# Patient Record
Sex: FEMALE | Race: WHITE | Employment: FULL TIME | URBAN - METROPOLITAN AREA
[De-identification: names, ages, dates, MRNs, and addresses within clinical notes are randomized per-mention and may not be internally consistent; named-entity substitution may affect disease eponyms.]

---

## 2017-03-30 ENCOUNTER — OFFICE VISIT (OUTPATIENT)
Dept: ORTHOPEDIC SURGERY | Age: 47
End: 2017-03-30

## 2017-03-30 VITALS
HEART RATE: 68 BPM | DIASTOLIC BLOOD PRESSURE: 70 MMHG | WEIGHT: 293 LBS | HEIGHT: 65 IN | BODY MASS INDEX: 48.82 KG/M2 | SYSTOLIC BLOOD PRESSURE: 116 MMHG

## 2017-03-30 DIAGNOSIS — M17.0 PRIMARY OSTEOARTHRITIS OF BOTH KNEES: Primary | ICD-10-CM

## 2017-03-30 DIAGNOSIS — M25.561 PAIN IN BOTH KNEES, UNSPECIFIED CHRONICITY: ICD-10-CM

## 2017-03-30 DIAGNOSIS — M25.562 PAIN IN BOTH KNEES, UNSPECIFIED CHRONICITY: ICD-10-CM

## 2017-03-30 DIAGNOSIS — E66.01 MORBID OBESITY, UNSPECIFIED OBESITY TYPE (HCC): ICD-10-CM

## 2017-03-30 PROCEDURE — 20610 DRAIN/INJ JOINT/BURSA W/O US: CPT | Performed by: ORTHOPAEDIC SURGERY

## 2017-03-30 PROCEDURE — 73564 X-RAY EXAM KNEE 4 OR MORE: CPT | Performed by: ORTHOPAEDIC SURGERY

## 2017-03-30 PROCEDURE — 99204 OFFICE O/P NEW MOD 45 MIN: CPT | Performed by: ORTHOPAEDIC SURGERY

## 2017-03-30 RX ORDER — FUROSEMIDE 20 MG/1
TABLET ORAL DAILY PRN
COMMUNITY
Start: 2017-03-27

## 2017-06-27 ENCOUNTER — OFFICE VISIT (OUTPATIENT)
Dept: ORTHOPEDIC SURGERY | Age: 47
End: 2017-06-27

## 2017-06-27 ENCOUNTER — TELEPHONE (OUTPATIENT)
Dept: ORTHOPEDIC SURGERY | Age: 47
End: 2017-06-27

## 2017-06-27 VITALS — BODY MASS INDEX: 48.82 KG/M2 | WEIGHT: 293 LBS | HEIGHT: 65 IN

## 2017-06-27 DIAGNOSIS — M25.561 PAIN IN BOTH KNEES, UNSPECIFIED CHRONICITY: ICD-10-CM

## 2017-06-27 DIAGNOSIS — E66.01 MORBID OBESITY, UNSPECIFIED OBESITY TYPE (HCC): ICD-10-CM

## 2017-06-27 DIAGNOSIS — M25.562 PAIN IN BOTH KNEES, UNSPECIFIED CHRONICITY: ICD-10-CM

## 2017-06-27 DIAGNOSIS — M17.0 PRIMARY OSTEOARTHRITIS OF BOTH KNEES: Primary | ICD-10-CM

## 2017-06-27 PROCEDURE — 99213 OFFICE O/P EST LOW 20 MIN: CPT | Performed by: ORTHOPAEDIC SURGERY

## 2017-06-27 PROCEDURE — 20610 DRAIN/INJ JOINT/BURSA W/O US: CPT | Performed by: ORTHOPAEDIC SURGERY

## 2017-07-18 ENCOUNTER — OFFICE VISIT (OUTPATIENT)
Dept: ORTHOPEDIC SURGERY | Age: 47
End: 2017-07-18

## 2017-07-18 VITALS — WEIGHT: 293 LBS | HEIGHT: 65 IN | BODY MASS INDEX: 48.82 KG/M2

## 2017-07-18 DIAGNOSIS — M17.0 PRIMARY OSTEOARTHRITIS OF BOTH KNEES: ICD-10-CM

## 2017-07-18 DIAGNOSIS — M25.562 PAIN IN BOTH KNEES, UNSPECIFIED CHRONICITY: Primary | ICD-10-CM

## 2017-07-18 DIAGNOSIS — M25.561 PAIN IN BOTH KNEES, UNSPECIFIED CHRONICITY: Primary | ICD-10-CM

## 2017-07-18 PROCEDURE — 20610 DRAIN/INJ JOINT/BURSA W/O US: CPT | Performed by: ORTHOPAEDIC SURGERY

## 2017-07-18 PROCEDURE — 99213 OFFICE O/P EST LOW 20 MIN: CPT | Performed by: ORTHOPAEDIC SURGERY

## 2017-07-20 ENCOUNTER — HOSPITAL ENCOUNTER (OUTPATIENT)
Dept: MAMMOGRAPHY | Age: 47
Discharge: HOME | End: 2017-07-20
Attending: NURSE PRACTITIONER | Admitting: NURSE PRACTITIONER

## 2017-07-20 DIAGNOSIS — Z12.31 ENCOUNTER FOR SCREENING MAMMOGRAM FOR BREAST CANCER: ICD-10-CM

## 2017-07-24 ENCOUNTER — TELEPHONE (OUTPATIENT)
Dept: ORTHOPEDIC SURGERY | Age: 47
End: 2017-07-24

## 2017-08-01 ENCOUNTER — OFFICE VISIT (OUTPATIENT)
Dept: ORTHOPEDIC SURGERY | Age: 47
End: 2017-08-01

## 2017-08-01 DIAGNOSIS — M79.671 RIGHT FOOT PAIN: ICD-10-CM

## 2017-08-01 DIAGNOSIS — M17.0 PRIMARY OSTEOARTHRITIS OF BOTH KNEES: Primary | ICD-10-CM

## 2017-08-01 PROCEDURE — 20610 DRAIN/INJ JOINT/BURSA W/O US: CPT | Performed by: ORTHOPAEDIC SURGERY

## 2017-08-08 ENCOUNTER — OFFICE VISIT (OUTPATIENT)
Dept: ORTHOPEDIC SURGERY | Age: 47
End: 2017-08-08

## 2017-08-08 VITALS
WEIGHT: 293 LBS | SYSTOLIC BLOOD PRESSURE: 114 MMHG | DIASTOLIC BLOOD PRESSURE: 69 MMHG | BODY MASS INDEX: 48.82 KG/M2 | HEART RATE: 70 BPM | HEIGHT: 65 IN

## 2017-08-08 DIAGNOSIS — M25.572 ACUTE LEFT ANKLE PAIN: Primary | ICD-10-CM

## 2017-08-08 DIAGNOSIS — M65.879 OTHER SYNOVITIS AND TENOSYNOVITIS, UNSPECIFIED ANKLE AND FOOT: ICD-10-CM

## 2017-08-08 DIAGNOSIS — M79.671 FOOT PAIN, RIGHT: ICD-10-CM

## 2017-08-08 DIAGNOSIS — M77.8 CAPSULITIS OF FOOT, RIGHT: ICD-10-CM

## 2017-08-08 DIAGNOSIS — M17.0 PRIMARY OSTEOARTHRITIS OF BOTH KNEES: Primary | ICD-10-CM

## 2017-08-08 DIAGNOSIS — M25.571 ACUTE RIGHT ANKLE PAIN: ICD-10-CM

## 2017-08-08 DIAGNOSIS — M79.672 FOOT PAIN, LEFT: ICD-10-CM

## 2017-08-08 PROCEDURE — 20610 DRAIN/INJ JOINT/BURSA W/O US: CPT | Performed by: ORTHOPAEDIC SURGERY

## 2017-08-08 PROCEDURE — 73630 X-RAY EXAM OF FOOT: CPT | Performed by: PODIATRIST

## 2017-08-08 PROCEDURE — 73600 X-RAY EXAM OF ANKLE: CPT | Performed by: PODIATRIST

## 2017-08-08 PROCEDURE — 99214 OFFICE O/P EST MOD 30 MIN: CPT | Performed by: PODIATRIST

## 2017-08-08 PROCEDURE — L3260 AMBULATORY SURGICAL BOOT EAC: HCPCS | Performed by: PODIATRIST

## 2017-08-08 RX ORDER — POTASSIUM CHLORIDE 750 MG/1
TABLET, FILM COATED, EXTENDED RELEASE ORAL
Refills: 3 | COMMUNITY
Start: 2017-07-18

## 2017-08-12 ENCOUNTER — HOSPITAL ENCOUNTER (OUTPATIENT)
Dept: ULTRASOUND IMAGING | Age: 47
Discharge: HOME | End: 2017-08-12
Attending: NURSE PRACTITIONER | Admitting: NURSE PRACTITIONER

## 2017-08-12 DIAGNOSIS — R10.11 ABDOMINAL PAIN, RIGHT UPPER QUADRANT: ICD-10-CM

## 2017-08-12 DIAGNOSIS — M25.561 PAIN IN RIGHT KNEE: ICD-10-CM

## 2017-09-20 ENCOUNTER — OFFICE VISIT (OUTPATIENT)
Dept: ORTHOPEDIC SURGERY | Age: 47
End: 2017-09-20

## 2017-09-20 VITALS
DIASTOLIC BLOOD PRESSURE: 76 MMHG | BODY MASS INDEX: 48.82 KG/M2 | WEIGHT: 293 LBS | HEIGHT: 65 IN | HEART RATE: 74 BPM | SYSTOLIC BLOOD PRESSURE: 125 MMHG

## 2017-09-20 DIAGNOSIS — M77.8 CAPSULITIS OF FOOT, RIGHT: Primary | ICD-10-CM

## 2017-09-20 PROCEDURE — 20600 DRAIN/INJ JOINT/BURSA W/O US: CPT | Performed by: PODIATRIST

## 2017-09-20 PROCEDURE — 99213 OFFICE O/P EST LOW 20 MIN: CPT | Performed by: PODIATRIST

## 2017-09-20 RX ORDER — CYCLOBENZAPRINE HCL 5 MG
TABLET ORAL
Refills: 0 | COMMUNITY
Start: 2017-08-11 | End: 2020-02-29 | Stop reason: ALTCHOICE

## 2017-12-28 ENCOUNTER — OFFICE VISIT (OUTPATIENT)
Dept: ORTHOPEDIC SURGERY | Age: 47
End: 2017-12-28

## 2017-12-28 VITALS — BODY MASS INDEX: 48.82 KG/M2 | HEIGHT: 65 IN | WEIGHT: 293 LBS

## 2017-12-28 DIAGNOSIS — M17.0 PRIMARY OSTEOARTHRITIS OF BOTH KNEES: Primary | ICD-10-CM

## 2017-12-28 DIAGNOSIS — M77.8 CAPSULITIS OF RIGHT FOOT: ICD-10-CM

## 2017-12-28 PROCEDURE — 20610 DRAIN/INJ JOINT/BURSA W/O US: CPT | Performed by: ORTHOPAEDIC SURGERY

## 2017-12-28 PROCEDURE — 99213 OFFICE O/P EST LOW 20 MIN: CPT | Performed by: ORTHOPAEDIC SURGERY

## 2017-12-28 PROCEDURE — 20600 DRAIN/INJ JOINT/BURSA W/O US: CPT | Performed by: ORTHOPAEDIC SURGERY

## 2017-12-28 NOTE — PROGRESS NOTES
KNEE VISIT      HISTORY OF PRESENT ILLNESS    De Schulz is a 49 y.o. female who presents for evaluation of her left knee with a right knee and right foot. She's had recurrence of pain in her left knee and realizes that she is at high risk for doing anything such as knee replacement surgery because of her age and BMI. At this time she just would like relief. She also obtain an injection into her right foot in the past which help substantially. ROS    Constitutional:  Denies fever or chills   Eyes:  Denies change in visual acuity   HENT:  Denies nasal congestion or sore throat   Respiratory:  Denies cough or shortness of breath   Cardiovascular:  Denies chest pain or edema   GI:  Denies abdominal pain, nausea, vomiting, bloody stools or diarrhea   :  Denies dysuria   Musculoskeletal:  Denies back pain or joint pain   Integument:  Denies rash   Neurologic:  Denies headache, focal weakness or sensory changes   Endocrine:  Denies polyuria or polydipsia   Lymphatic:  Denies swollen glands   Psychiatric:  Denies depression or anxiety   All other ROS negative except for above. Past Surgical history    Past Surgical History:   Procedure Laterality Date    APPENDECTOMY      BREAST SURGERY      bilateral lumpectomy    CARPAL TUNNEL RELEASE      bilateral    CHOLECYSTECTOMY      HYSTERECTOMY      KNEE SURGERY         PAST MEDICAL    Past Medical History:   Diagnosis Date    Other synovitis and tenosynovitis, unspecified ankle and foot 8/8/2017    Reflux     Sleep apnea        Allergies    Allergies   Allergen Reactions    No Known Allergies        Meds    Current Outpatient Prescriptions   Medication Sig Dispense Refill    cyclobenzaprine (FLEXERIL) 5 MG tablet TAKE 1 TABLET BY MOUTH 3 (THREE) TIMES DAILY AS NEEDED.  0    KLOR-CON 10 10 MEQ extended release tablet TAKE 1 TABLET BY MOUTH 2 (TWO) TIMES DAILY.   3    diclofenac 2 % SOLN Place 40 mg onto the skin 2 times daily 1 Bottle 3    furosemide (LASIX) 20 MG tablet        No current facility-administered medications for this visit. Social    Social History     Social History    Marital status:      Spouse name: N/A    Number of children: N/A    Years of education: N/A     Occupational History    Not on file. Social History Main Topics    Smoking status: Never Smoker    Smokeless tobacco: Never Used    Alcohol use No    Drug use: No    Sexual activity: Yes     Partners: Male     Other Topics Concern    Not on file     Social History Narrative    No narrative on file       Family HISTORY    History reviewed. No pertinent family history. PHYSICAL EXAM    Vital Signs:  Ht 5' 4.96\" (1.65 m)   Wt (!) 300 lb 0.7 oz (136.1 kg)   BMI 49.99 kg/m²   General Appearance:  Obese body habitus with a BMI of 50. Alert and oriented to person, place, and time. Affect:  Normal.   Gait:  Normal. Good balance and coordination. Skin:  Intact. Sensation:  Intact. Strength:  Intact. Reflexes:  Intact. Pulses:  Intact. Knee Exam:    Effusion:  Negative    Range of Motion Right Left   Extension 0 0   Flexion 110 110     Provocative Test Right Left    Positive Negative Positive Negative   Anterior drawer [] [x] [] [x]   Lachman [] [x] [] [x]   Posterior drawer [] [x] [] [x]   Varus testing [] [x] [] [x]   Valgus testing [] [x] [x] []   Joint line tenderness [] [x] [x] []     Additional Exam Comments:  Her neurocirculatory lymphatic exam is otherwise normal symmetric to both lower extremities, although she does have some metatarsalgia and between the 2nd 3rd metatarsal heads. The anterior surface. She had an injection in the past which seemed to help. IMAGING STUDIES    X-rays were not done today    IMPRESSION    Left knee, motion patella/osteoarthritis  Right foot 3rd metatarsophalangeal joint capsulitis    PLAN      1.   Conservative care options including physical therapy, NSAIDs, bracing, and activity modification were discussed. 2.  The indications for therapeutic injections were discussed. 3.  The indications for additional imaging studies were discussed. 4.  After considering the various options discussed, the patient elected to pursue a course that includes injections into left knee and right foot    Recommendation is for a cortisone injection into the right knee. After informed consent was received from the patient, the right knee was injected with 1 mL(40mg)Depo-Medrol and 4 mL of 0.25% Marcaine  in the syringe from an anterolateral joint line approach, using a 25-gauge needle, under sterile Betadine prep, using ethyl chloride as a topical refrigerant, for a diagnosis of osteoarthritis. The patient appeared to tolerate it well. The patient should return here periodically as needed. An injection was performed to the bottom of her right foot into the metatarsal phalangeal joint around the 3rd ray. It was done under sterile Betadine prep, using ethyl chloride as topical refrigerant. The solution was 1 mL of bupivacaine 0.25%, 1 mL of betamethasone, and 1 mL of 1% Xylocaine.

## 2017-12-28 NOTE — PROGRESS NOTES
Site: LEFT KNEE        BUPIVACAINE 0.25%  NDC# 4747-2836-46  LOT NUMBER: [id number]        DEPO 40mg    NDC#  6042-8012-50  LOT NUMBER: [id number]    Site: RIGHT FOOT    Betamethazone  Lot number: [id number]  NDC#  7114-0723-63(ZGZN)    Lidocaine  LidocaineNDC# 5380-7755-52  Lot number: [id number]    Bupivacaine  NDC# 5403-3077-51  Lot number: [id number]

## 2018-01-23 ENCOUNTER — OFFICE VISIT (OUTPATIENT)
Dept: ORTHOPEDIC SURGERY | Age: 48
End: 2018-01-23

## 2018-01-23 VITALS — HEIGHT: 64 IN | BODY MASS INDEX: 50.02 KG/M2 | WEIGHT: 293 LBS

## 2018-01-23 DIAGNOSIS — M17.0 PRIMARY OSTEOARTHRITIS OF BOTH KNEES: Primary | ICD-10-CM

## 2018-01-23 PROCEDURE — G8427 DOCREV CUR MEDS BY ELIG CLIN: HCPCS | Performed by: ORTHOPAEDIC SURGERY

## 2018-01-23 PROCEDURE — G8417 CALC BMI ABV UP PARAM F/U: HCPCS | Performed by: ORTHOPAEDIC SURGERY

## 2018-01-23 PROCEDURE — 1036F TOBACCO NON-USER: CPT | Performed by: ORTHOPAEDIC SURGERY

## 2018-01-23 PROCEDURE — 99213 OFFICE O/P EST LOW 20 MIN: CPT | Performed by: ORTHOPAEDIC SURGERY

## 2018-01-23 PROCEDURE — G8484 FLU IMMUNIZE NO ADMIN: HCPCS | Performed by: ORTHOPAEDIC SURGERY

## 2018-01-23 NOTE — PROGRESS NOTES
KNEE VISIT      HISTORY OF PRESENT ILLNESS    Kate Marc is a 49 y.o. female who presents for evaluation of her left knee. The operative    ROS    Constitutional:  Denies fever or chills   Eyes:  Denies change in visual acuity   HENT:  Denies nasal congestion or sore throat   Respiratory:  Denies cough or shortness of breath   Cardiovascular:  Denies chest pain or edema   GI:  Denies abdominal pain, nausea, vomiting, bloody stools or diarrhea   :  Denies dysuria   Musculoskeletal:  Denies back pain or joint pain   Integument:  Denies rash   Neurologic:  Denies headache, focal weakness or sensory changes   Endocrine:  Denies polyuria or polydipsia   Lymphatic:  Denies swollen glands   Psychiatric:  Denies depression or anxiety   All other ROS negative except for above. Past Surgical history    Past Surgical History:   Procedure Laterality Date    APPENDECTOMY      BREAST SURGERY      bilateral lumpectomy    CARPAL TUNNEL RELEASE      bilateral    CHOLECYSTECTOMY      HYSTERECTOMY      KNEE SURGERY         PAST MEDICAL    Past Medical History:   Diagnosis Date    Other synovitis and tenosynovitis, unspecified ankle and foot 8/8/2017    Reflux     Sleep apnea        Allergies    Allergies   Allergen Reactions    No Known Allergies        Meds    Current Outpatient Prescriptions   Medication Sig Dispense Refill    cyclobenzaprine (FLEXERIL) 5 MG tablet TAKE 1 TABLET BY MOUTH 3 (THREE) TIMES DAILY AS NEEDED.  0    KLOR-CON 10 10 MEQ extended release tablet TAKE 1 TABLET BY MOUTH 2 (TWO) TIMES DAILY. 3    diclofenac 2 % SOLN Place 40 mg onto the skin 2 times daily 1 Bottle 3    furosemide (LASIX) 20 MG tablet        No current facility-administered medications for this visit. Social    Social History     Social History    Marital status:      Spouse name: N/A    Number of children: N/A    Years of education: N/A     Occupational History    Not on file.      Social History Main

## 2018-01-24 ENCOUNTER — TELEPHONE (OUTPATIENT)
Dept: ORTHOPEDIC SURGERY | Age: 48
End: 2018-01-24

## 2018-01-24 DIAGNOSIS — M17.12 LOCALIZED OSTEOARTHRITIS OF LEFT KNEE: ICD-10-CM

## 2018-01-24 DIAGNOSIS — M23.42 LOOSE BODY OF LEFT KNEE: ICD-10-CM

## 2018-01-26 ENCOUNTER — HOSPITAL ENCOUNTER (OUTPATIENT)
Dept: SURGERY | Age: 48
Discharge: HOME | End: 2018-01-26
Attending: ORTHOPAEDIC SURGERY | Admitting: ORTHOPAEDIC SURGERY

## 2018-01-26 VITALS
RESPIRATION RATE: 14 BRPM | DIASTOLIC BLOOD PRESSURE: 67 MMHG | WEIGHT: 293 LBS | HEART RATE: 71 BPM | OXYGEN SATURATION: 94 % | SYSTOLIC BLOOD PRESSURE: 101 MMHG | BODY MASS INDEX: 50.02 KG/M2 | TEMPERATURE: 97 F | HEIGHT: 64 IN

## 2018-01-26 DIAGNOSIS — M17.12 PRIMARY OSTEOARTHRITIS OF LEFT KNEE: Primary | ICD-10-CM

## 2018-01-26 LAB
ANION GAP SERPL CALCULATED.3IONS-SCNC: 11 MMOL/L (ref 3–16)
BUN BLDV-MCNC: 13 MG/DL (ref 7–20)
CALCIUM SERPL-MCNC: 8.6 MG/DL (ref 8.3–10.6)
CHLORIDE BLD-SCNC: 103 MMOL/L (ref 99–110)
CO2: 26 MMOL/L (ref 21–32)
CREAT SERPL-MCNC: <0.5 MG/DL (ref 0.6–1.1)
GFR AFRICAN AMERICAN: >60
GFR NON-AFRICAN AMERICAN: >60
GLUCOSE BLD-MCNC: 85 MG/DL (ref 70–99)
POTASSIUM REFLEX MAGNESIUM: 4 MMOL/L (ref 3.5–5.1)
PREGNANCY, URINE: NEGATIVE
SODIUM BLD-SCNC: 140 MMOL/L (ref 136–145)

## 2018-01-26 RX ORDER — SODIUM CHLORIDE 0.9 % (FLUSH) 0.9 %
10 SYRINGE (ML) INJECTION EVERY 12 HOURS SCHEDULED
Status: DISCONTINUED | OUTPATIENT
Start: 2018-01-26 | End: 2018-01-27 | Stop reason: HOSPADM

## 2018-01-26 RX ORDER — LABETALOL HYDROCHLORIDE 5 MG/ML
5 INJECTION, SOLUTION INTRAVENOUS EVERY 10 MIN PRN
Status: DISCONTINUED | OUTPATIENT
Start: 2018-01-26 | End: 2018-01-27 | Stop reason: HOSPADM

## 2018-01-26 RX ORDER — LIDOCAINE HYDROCHLORIDE 10 MG/ML
1 INJECTION, SOLUTION EPIDURAL; INFILTRATION; INTRACAUDAL; PERINEURAL
Status: COMPLETED | OUTPATIENT
Start: 2018-01-26 | End: 2018-01-26

## 2018-01-26 RX ORDER — MEPERIDINE HYDROCHLORIDE 25 MG/ML
12.5 INJECTION INTRAMUSCULAR; INTRAVENOUS; SUBCUTANEOUS EVERY 5 MIN PRN
Status: DISCONTINUED | OUTPATIENT
Start: 2018-01-26 | End: 2018-01-27 | Stop reason: HOSPADM

## 2018-01-26 RX ORDER — ALUMINA, MAGNESIA, AND SIMETHICONE 2400; 2400; 240 MG/30ML; MG/30ML; MG/30ML
SUSPENSION ORAL
Status: COMPLETED
Start: 2018-01-26 | End: 2018-01-26

## 2018-01-26 RX ORDER — HYDROCODONE BITARTRATE AND ACETAMINOPHEN 7.5; 325 MG/1; MG/1
1 TABLET ORAL EVERY 6 HOURS PRN
Qty: 28 TABLET | Refills: 0 | Status: SHIPPED | OUTPATIENT
Start: 2018-01-26 | End: 2018-02-02

## 2018-01-26 RX ORDER — OXYCODONE HYDROCHLORIDE AND ACETAMINOPHEN 5; 325 MG/1; MG/1
1 TABLET ORAL PRN
Status: ACTIVE | OUTPATIENT
Start: 2018-01-26 | End: 2018-01-26

## 2018-01-26 RX ORDER — MORPHINE SULFATE 2 MG/ML
2 INJECTION, SOLUTION INTRAMUSCULAR; INTRAVENOUS EVERY 5 MIN PRN
Status: DISCONTINUED | OUTPATIENT
Start: 2018-01-26 | End: 2018-01-27 | Stop reason: HOSPADM

## 2018-01-26 RX ORDER — HYDRALAZINE HYDROCHLORIDE 20 MG/ML
5 INJECTION INTRAMUSCULAR; INTRAVENOUS EVERY 10 MIN PRN
Status: DISCONTINUED | OUTPATIENT
Start: 2018-01-26 | End: 2018-01-27 | Stop reason: HOSPADM

## 2018-01-26 RX ORDER — DIPHENHYDRAMINE HYDROCHLORIDE 50 MG/ML
12.5 INJECTION INTRAMUSCULAR; INTRAVENOUS
Status: ACTIVE | OUTPATIENT
Start: 2018-01-26 | End: 2018-01-26

## 2018-01-26 RX ORDER — SODIUM CHLORIDE 0.9 % (FLUSH) 0.9 %
10 SYRINGE (ML) INJECTION PRN
Status: DISCONTINUED | OUTPATIENT
Start: 2018-01-26 | End: 2018-01-27 | Stop reason: HOSPADM

## 2018-01-26 RX ORDER — MORPHINE SULFATE 2 MG/ML
1 INJECTION, SOLUTION INTRAMUSCULAR; INTRAVENOUS EVERY 5 MIN PRN
Status: DISCONTINUED | OUTPATIENT
Start: 2018-01-26 | End: 2018-01-27 | Stop reason: HOSPADM

## 2018-01-26 RX ORDER — ONDANSETRON 2 MG/ML
4 INJECTION INTRAMUSCULAR; INTRAVENOUS
Status: COMPLETED | OUTPATIENT
Start: 2018-01-26 | End: 2018-01-26

## 2018-01-26 RX ORDER — ALUMINA, MAGNESIA, AND SIMETHICONE 2400; 2400; 240 MG/30ML; MG/30ML; MG/30ML
30 SUSPENSION ORAL EVERY 6 HOURS PRN
Status: DISCONTINUED | OUTPATIENT
Start: 2018-01-26 | End: 2018-01-27 | Stop reason: HOSPADM

## 2018-01-26 RX ORDER — TRANEXAMIC ACID 100 MG/ML
2000 INJECTION, SOLUTION INTRAVENOUS
Status: ACTIVE | OUTPATIENT
Start: 2018-01-26 | End: 2018-01-26

## 2018-01-26 RX ORDER — SODIUM CHLORIDE, SODIUM LACTATE, POTASSIUM CHLORIDE, CALCIUM CHLORIDE 600; 310; 30; 20 MG/100ML; MG/100ML; MG/100ML; MG/100ML
INJECTION, SOLUTION INTRAVENOUS CONTINUOUS
Status: DISCONTINUED | OUTPATIENT
Start: 2018-01-26 | End: 2018-01-27 | Stop reason: HOSPADM

## 2018-01-26 RX ORDER — PROMETHAZINE HYDROCHLORIDE 25 MG/ML
6.25 INJECTION, SOLUTION INTRAMUSCULAR; INTRAVENOUS
Status: ACTIVE | OUTPATIENT
Start: 2018-01-26 | End: 2018-01-26

## 2018-01-26 RX ORDER — OXYCODONE HYDROCHLORIDE AND ACETAMINOPHEN 5; 325 MG/1; MG/1
2 TABLET ORAL PRN
Status: ACTIVE | OUTPATIENT
Start: 2018-01-26 | End: 2018-01-26

## 2018-01-26 RX ADMIN — SODIUM CHLORIDE, SODIUM LACTATE, POTASSIUM CHLORIDE, CALCIUM CHLORIDE: 600; 310; 30; 20 INJECTION, SOLUTION INTRAVENOUS at 08:09

## 2018-01-26 RX ADMIN — Medication 0.5 MG: at 11:05

## 2018-01-26 RX ADMIN — Medication 0.5 MG: at 10:19

## 2018-01-26 RX ADMIN — SODIUM CHLORIDE, SODIUM LACTATE, POTASSIUM CHLORIDE, CALCIUM CHLORIDE: 600; 310; 30; 20 INJECTION, SOLUTION INTRAVENOUS at 11:05

## 2018-01-26 RX ADMIN — ALUMINA, MAGNESIA, AND SIMETHICONE 30 ML: 2400; 2400; 240 SUSPENSION ORAL at 10:34

## 2018-01-26 RX ADMIN — ONDANSETRON 4 MG: 2 INJECTION INTRAMUSCULAR; INTRAVENOUS at 13:00

## 2018-01-26 RX ADMIN — LIDOCAINE HYDROCHLORIDE 1 ML: 10 INJECTION, SOLUTION EPIDURAL; INFILTRATION; INTRACAUDAL; PERINEURAL at 08:09

## 2018-01-26 RX ADMIN — Medication 0.5 MG: at 10:11

## 2018-01-26 RX ADMIN — Medication 0.5 MG: at 11:13

## 2018-01-26 RX ADMIN — Medication 0.5 MG: at 10:34

## 2018-01-26 RX ADMIN — MEPERIDINE HYDROCHLORIDE 12.5 MG: 25 INJECTION INTRAMUSCULAR; INTRAVENOUS; SUBCUTANEOUS at 10:26

## 2018-01-26 RX ADMIN — Medication 0.5 MG: at 10:39

## 2018-01-26 ASSESSMENT — PAIN SCALES - GENERAL
PAINLEVEL_OUTOF10: 8
PAINLEVEL_OUTOF10: 7
PAINLEVEL_OUTOF10: 8

## 2018-01-26 ASSESSMENT — PAIN - FUNCTIONAL ASSESSMENT: PAIN_FUNCTIONAL_ASSESSMENT: 0-10

## 2018-01-26 NOTE — PROGRESS NOTES
D/c instructions given to pt and family. Verbalized understanding. Wheeled pt out to personal transportation in stable condition.

## 2018-01-26 NOTE — ANESTHESIA PRE-OP
Department of Anesthesiology  Preprocedure Note       Name:  Kristen Stephen   Age:  49 y.o.  :  1970                                          MRN:  [id number]         Date:  2018      Surgeon:    Procedure:    Medications prior to admission:   Prior to Admission medications    Medication Sig Start Date End Date Taking? Authorizing Provider   HYDROcodone-acetaminophen (NORCO) 5-325 MG per tablet Take 1 tablet by mouth every 6 hours as needed for Pain. Yes Historical Provider, MD   cyclobenzaprine (FLEXERIL) 5 MG tablet TAKE 1 TABLET BY MOUTH 3 (THREE) TIMES DAILY AS NEEDED. 17   Historical Provider, MD GALDAMEZOR-CON 10 10 MEQ extended release tablet TAKE 1 TABLET BY MOUTH 2 (TWO) TIMES DAILY., ON DAYS TAKING LASIX 17   Historical Provider, MD   furosemide (LASIX) 20 MG tablet daily as needed  3/27/17   Historical Provider, MD       Current medications:    Current Outpatient Prescriptions   Medication Sig Dispense Refill    HYDROcodone-acetaminophen (NORCO) 5-325 MG per tablet Take 1 tablet by mouth every 6 hours as needed for Pain.       cyclobenzaprine (FLEXERIL) 5 MG tablet TAKE 1 TABLET BY MOUTH 3 (THREE) TIMES DAILY AS NEEDED.  0    KLOR-CON 10 10 MEQ extended release tablet TAKE 1 TABLET BY MOUTH 2 (TWO) TIMES DAILY., ON DAYS TAKING LASIX  3    furosemide (LASIX) 20 MG tablet daily as needed        Current Facility-Administered Medications   Medication Dose Route Frequency Provider Last Rate Last Dose    lactated ringers infusion   Intravenous Continuous Omar Alvarez  mL/hr at 18 0809      sodium chloride flush 0.9 % injection 10 mL  10 mL Intravenous 2 times per day Omar Alvarez MD        sodium chloride flush 0.9 % injection 10 mL  10 mL Intravenous PRN Omar Alvarez MD        ceFAZolin (ANCEF) 3 g in dextrose 5 % 100 mL IVPB  3 g Intravenous On Call to C/ Brandon Bravo MD        tranexamic acid (CYKLOKAPRON) injection 2,000 mg  2,000 mg

## 2018-01-26 NOTE — PROGRESS NOTES
Pt arrived to PACU c/o pain in LLE. Medication given, see MAR. Knee pain let up but c/o sharp pain in chest.  Pt states it is a familiar pain but more intense, states the pain is similar to GI pain. MD notified, Mylanta ordered and administered. Pt reports a very slight decrease in pain.

## 2018-01-26 NOTE — H&P
H&P Update    Patient's History and Physical  was reviewed. Patient examined. There has been no change.     Hollis Fix 3

## 2018-01-29 PROBLEM — M23.42 LOOSE BODY OF LEFT KNEE: Status: ACTIVE | Noted: 2018-01-29

## 2018-01-30 ENCOUNTER — OFFICE VISIT (OUTPATIENT)
Dept: ORTHOPEDIC SURGERY | Age: 48
End: 2018-01-30

## 2018-01-30 VITALS — BODY MASS INDEX: 50.02 KG/M2 | WEIGHT: 293 LBS | HEIGHT: 64 IN

## 2018-01-30 DIAGNOSIS — M17.0 PRIMARY OSTEOARTHRITIS OF BOTH KNEES: Primary | ICD-10-CM

## 2018-01-30 DIAGNOSIS — M25.561 CHRONIC PAIN OF BOTH KNEES: ICD-10-CM

## 2018-01-30 DIAGNOSIS — G89.29 CHRONIC PAIN OF BOTH KNEES: ICD-10-CM

## 2018-01-30 DIAGNOSIS — M25.562 CHRONIC PAIN OF BOTH KNEES: ICD-10-CM

## 2018-01-30 PROCEDURE — 20610 DRAIN/INJ JOINT/BURSA W/O US: CPT | Performed by: ORTHOPAEDIC SURGERY

## 2018-01-30 PROCEDURE — 99024 POSTOP FOLLOW-UP VISIT: CPT | Performed by: ORTHOPAEDIC SURGERY

## 2018-01-30 NOTE — PATIENT INSTRUCTIONS
Patient Education        Knee: Exercises  Your Care Instructions  Here are some examples of exercises for your knee. Start each exercise slowly. Ease off the exercise if you start to have pain. Your doctor or physical therapist will tell you when you can start these exercises and which ones will work best for you. How to do the exercises  Quad sets    1. Sit with your leg straight and supported on the floor or a firm bed. (If you feel discomfort in the front or back of your knee, place a small towel roll under your knee.)  2. Tighten the muscles on top of your thigh by pressing the back of your knee flat down to the floor. (If you feel discomfort under your kneecap, place a small towel roll under your knee.)  3. Hold for about 6 seconds, then rest for up to 10 seconds. 4. Do 8 to 12 repetitions several times a day. Straight-leg raises to the front    1. Lie on your back with your good knee bent so that your foot rests flat on the floor. Your injured leg should be straight. Make sure that your low back has a normal curve. You should be able to slip your flat hand in between the floor and the small of your back, with your palm touching the floor and your back touching the back of your hand. 2. Tighten the thigh muscles in the injured leg by pressing the back of your knee flat down to the floor. Hold your knee straight. 3. Keeping the thigh muscles tight, lift your injured leg up so that your heel is about 12 inches off the floor. Hold for about 6 seconds and then lower slowly. 4. Do 8 to 12 repetitions, 3 times a day. Straight-leg raises to the outside    1. Lie on your side, with your injured leg on top. 2. Tighten the front thigh muscles of your injured leg to keep your knee straight. 3. Keep your hip and your leg straight in line with the rest of your body, and keep your knee pointing forward. Do not drop your hip back.   4. Lift your injured leg straight up toward the ceiling, about 12 inches off the floor. Hold for about 6 seconds, then slowly lower your leg. 5. Do 8 to 12 repetitions. Straight-leg raises to the back    1. Lie on your stomach, and lift your leg straight up behind you (toward the ceiling). 2. Lift your toes about 6 inches off the floor, hold for about 6 seconds, then lower slowly. 3. Do 8 to 12 repetitions. Straight-leg raises to the inside    1. Lie on the side of your body with the injured leg. 2. You can either prop your other (good) leg up on a chair, or you can bend your good knee and put that foot in front of your injured knee. Do not drop your hip back. 3. Tighten the muscles on the front of your thigh to straighten your injured knee. 4. Keep your kneecap pointing forward, and lift your whole leg up toward the ceiling about 6 inches. Hold for about 6 seconds, then lower slowly. 5. Do 8 to 12 repetitions. Heel dig bridging    1. Lie on your back with both knees bent and your ankles bent so that only your heels are digging into the floor. Your knees should be bent about 90 degrees. 2. Then push your heels into the floor, squeeze your buttocks, and lift your hips off the floor until your shoulders, hips, and knees are all in a straight line. 3. Hold for about 6 seconds as you continue to breathe normally, and then slowly lower your hips back down to the floor and rest for up to 10 seconds. 4. Do 8 to 12 repetitions. Hamstring curls    1. Lie on your stomach with your knees straight. If your kneecap is uncomfortable, roll up a washcloth and put it under your leg just above your kneecap. 2. Lift the foot of your injured leg by bending the knee so that you bring the foot up toward your buttock. If this motion hurts, try it without bending your knee quite as far. This may help you avoid any painful motion. 3. Slowly lower your leg back to the floor. 4. Do 8 to 12 repetitions.   5. With permission from your doctor or physical therapist, you may also want to add a cuff weight to your ankle (not more than 5 pounds). With weight, you do not have to lift your leg more than 12 inches to get a hamstring workout. Shallow standing knee bends    Do this exercise only if you have very little pain; if you have no clicking, locking, or giving way if you have an injured knee; and if it does not hurt while you are doing 8 to 12 repetitions. 1. Stand with your hands lightly resting on a counter or chair in front of you. Put your feet shoulder-width apart. 2. Slowly bend your knees so that you squat down like you are going to sit in a chair. Make sure your knees do not go in front of your toes. 3. Lower yourself about 6 inches. Your heels should remain on the floor at all times. 4. Rise slowly to a standing position. Heel raises    1. Stand with your feet a few inches apart, with your hands lightly resting on a counter or chair in front of you. 2. Slowly raise your heels off the floor while keeping your knees straight. 3. Hold for about 6 seconds, then slowly lower your heels to the floor. 4. Do 8 to 12 repetitions several times during the day. Follow-up care is a key part of your treatment and safety. Be sure to make and go to all appointments, and call your doctor if you are having problems. It's also a good idea to know your test results and keep a list of the medicines you take. Where can you learn more? Go to https://[email or web address]. org and sign in to your [employer] account. Enter Y166 in the [facility] box to learn more about \"Knee: Exercises. \"     If you do not have an account, please click on the \"Sign Up Now\" link. Current as of: March 21, 2017  Content Version: 11.5  © 3343-2082 Healthwise, Incorporated. Care instructions adapted under license by [facility] ([facility]).  If you have questions about a medical condition or this instruction, always ask your healthcare professional. [place] [address] any warranty or liability for your use of this information.

## 2018-02-26 ENCOUNTER — OFFICE VISIT (OUTPATIENT)
Dept: ORTHOPEDIC SURGERY | Age: 48
End: 2018-02-26

## 2018-02-26 VITALS — BODY MASS INDEX: 50.02 KG/M2 | WEIGHT: 293 LBS | HEIGHT: 64 IN

## 2018-02-26 DIAGNOSIS — M17.0 PRIMARY OSTEOARTHRITIS OF BOTH KNEES: Primary | ICD-10-CM

## 2018-02-26 DIAGNOSIS — M17.12 LOCALIZED OSTEOARTHRITIS OF LEFT KNEE: ICD-10-CM

## 2018-02-26 PROCEDURE — 99024 POSTOP FOLLOW-UP VISIT: CPT | Performed by: ORTHOPAEDIC SURGERY

## 2018-03-20 ENCOUNTER — TELEPHONE (OUTPATIENT)
Dept: ORTHOPEDIC SURGERY | Age: 48
End: 2018-03-20

## 2018-03-20 ENCOUNTER — OFFICE VISIT (OUTPATIENT)
Dept: ORTHOPEDIC SURGERY | Age: 48
End: 2018-03-20

## 2018-03-20 VITALS
HEART RATE: 84 BPM | BODY MASS INDEX: 50.02 KG/M2 | SYSTOLIC BLOOD PRESSURE: 119 MMHG | WEIGHT: 293 LBS | DIASTOLIC BLOOD PRESSURE: 88 MMHG | HEIGHT: 64 IN

## 2018-03-20 DIAGNOSIS — M77.8 CAPSULITIS OF RIGHT FOOT: Primary | ICD-10-CM

## 2018-03-20 PROCEDURE — 99024 POSTOP FOLLOW-UP VISIT: CPT | Performed by: PODIATRIST

## 2018-03-20 PROCEDURE — L4361 PNEUMA/VAC WALK BOOT PRE OTS: HCPCS | Performed by: PODIATRIST

## 2018-03-20 RX ORDER — IBUPROFEN 800 MG/1
800 TABLET ORAL
COMMUNITY
End: 2018-11-28 | Stop reason: CLARIF

## 2018-04-03 ENCOUNTER — OFFICE VISIT (OUTPATIENT)
Dept: ORTHOPEDIC SURGERY | Age: 48
End: 2018-04-03

## 2018-04-03 VITALS
DIASTOLIC BLOOD PRESSURE: 76 MMHG | BODY MASS INDEX: 50.02 KG/M2 | HEIGHT: 64 IN | WEIGHT: 293 LBS | SYSTOLIC BLOOD PRESSURE: 115 MMHG | HEART RATE: 87 BPM

## 2018-04-03 DIAGNOSIS — M77.8 CAPSULITIS OF RIGHT FOOT: Primary | ICD-10-CM

## 2018-04-03 PROCEDURE — G8417 CALC BMI ABV UP PARAM F/U: HCPCS | Performed by: PODIATRIST

## 2018-04-03 PROCEDURE — 99213 OFFICE O/P EST LOW 20 MIN: CPT | Performed by: PODIATRIST

## 2018-04-03 PROCEDURE — 1036F TOBACCO NON-USER: CPT | Performed by: PODIATRIST

## 2018-04-03 PROCEDURE — G8427 DOCREV CUR MEDS BY ELIG CLIN: HCPCS | Performed by: PODIATRIST

## 2018-04-03 RX ORDER — PREDNISONE 10 MG/1
TABLET ORAL
Qty: 26 TABLET | Refills: 0 | Status: SHIPPED | OUTPATIENT
Start: 2018-04-03 | End: 2018-11-28 | Stop reason: CLARIF

## 2018-04-13 ENCOUNTER — TELEPHONE (OUTPATIENT)
Dept: ORTHOPEDIC SURGERY | Age: 48
End: 2018-04-13

## 2018-06-13 ENCOUNTER — TELEPHONE (OUTPATIENT)
Dept: ORTHOPEDIC SURGERY | Age: 48
End: 2018-06-13

## 2018-08-10 ENCOUNTER — OFFICE VISIT (OUTPATIENT)
Dept: ORTHOPEDIC SURGERY | Age: 48
End: 2018-08-10

## 2018-08-10 VITALS — BODY MASS INDEX: 48.82 KG/M2 | WEIGHT: 293 LBS | HEIGHT: 65 IN

## 2018-08-10 DIAGNOSIS — M77.8 CAPSULITIS OF RIGHT FOOT: Primary | ICD-10-CM

## 2018-08-10 PROCEDURE — 1036F TOBACCO NON-USER: CPT | Performed by: ORTHOPAEDIC SURGERY

## 2018-08-10 PROCEDURE — G8428 CUR MEDS NOT DOCUMENT: HCPCS | Performed by: ORTHOPAEDIC SURGERY

## 2018-08-10 PROCEDURE — G8417 CALC BMI ABV UP PARAM F/U: HCPCS | Performed by: ORTHOPAEDIC SURGERY

## 2018-08-10 PROCEDURE — 99214 OFFICE O/P EST MOD 30 MIN: CPT | Performed by: ORTHOPAEDIC SURGERY

## 2018-08-10 NOTE — PROGRESS NOTES
Chief Complaint    New Patient (R Foot)      History of Present Illness:  Tiarra Mosquera is a 51 y.o. female who is here as a 2nd opinion for evaluation chief complaint of right forefoot pain. She states this began in August 2017 without history of trauma. Developed pain underneath the 2nd 3rd metatarsal head MTP joint region. She was seen by Dr. Misty Gale who injected her 2nd MTP joint after an MRI showed capsulitis and no evidence of plantar plate tear. This helped her quite a bit and then she saw another physician who injected her approximately 3-4 months later. States that only helped minimally. She has significant swelling in both lower extremities that she states she is taking Lasix and using compression hose. Feels like her pain is an 8-9 out of 10. She is an  and is only on her feet part-time    Medical History:  Patient's medications, allergies, past medical, surgical, social and family histories were reviewed and updated as appropriate. Review of Systems:  Pertinent items are noted in HPI  Review of systems reviewed from Patient History Form dated on August 10, 2018 and available in the patient's chart under the Media tab. Vital Signs:  Ht 5' 5\" (1.651 m)   Wt (!) 317 lb (143.8 kg)   BMI 52.75 kg/m²     General Exam:   Constitutional: Patient is adequately groomed with no evidence of malnutrition  DTRs: Deep tendon reflexes are intact  Mental Status: The patient is oriented to time, place and person. The patient's mood and affect are appropriate. Lymphatic: The lymphatic examination bilaterally reveals all areas to be without enlargement or induration. Foot Examination:    Inspection:  Moderate to severe swelling both lower extremities with pitting edema throughout the shin and midfoot.   In standing the right 2nd and 3rd toes are up off the ground    Palpation:  Tenderness throughout the right forefoot especially 2nd and 3rd MTP joints on the plantar

## 2018-10-18 ENCOUNTER — HOSPITAL ENCOUNTER (OUTPATIENT)
Dept: MAMMOGRAPHY | Age: 48
Discharge: HOME | End: 2018-10-23
Payer: COMMERCIAL

## 2018-10-18 DIAGNOSIS — Z12.31 VISIT FOR SCREENING MAMMOGRAM: ICD-10-CM

## 2018-10-18 PROCEDURE — 77067 SCR MAMMO BI INCL CAD: CPT

## 2018-11-28 ENCOUNTER — OFFICE VISIT (OUTPATIENT)
Dept: RHEUMATOLOGY | Age: 48
End: 2018-11-28
Payer: COMMERCIAL

## 2018-11-28 ENCOUNTER — HOSPITAL ENCOUNTER (OUTPATIENT)
Dept: GENERAL RADIOLOGY | Age: 48
Discharge: HOME | End: 2018-11-28
Payer: COMMERCIAL

## 2018-11-28 ENCOUNTER — HOSPITAL ENCOUNTER (OUTPATIENT)
Age: 48
Discharge: HOME | End: 2018-11-28
Payer: COMMERCIAL

## 2018-11-28 VITALS
WEIGHT: 293 LBS | DIASTOLIC BLOOD PRESSURE: 78 MMHG | HEIGHT: 64 IN | TEMPERATURE: 99.1 F | BODY MASS INDEX: 50.02 KG/M2 | HEART RATE: 70 BPM | SYSTOLIC BLOOD PRESSURE: 122 MMHG

## 2018-11-28 DIAGNOSIS — M25.50 POLYARTHRALGIA: ICD-10-CM

## 2018-11-28 DIAGNOSIS — M15.9 PRIMARY OSTEOARTHRITIS INVOLVING MULTIPLE JOINTS: ICD-10-CM

## 2018-11-28 DIAGNOSIS — L40.9 PSORIASIS: Primary | ICD-10-CM

## 2018-11-28 DIAGNOSIS — M79.89 LEG SWELLING: ICD-10-CM

## 2018-11-28 LAB
C-REACTIVE PROTEIN: 21.4 MG/L (ref 0–5.1)
HBV SURFACE AB TITR SER: <3.5 MIU/ML
HEPATITIS B CORE IGM ANTIBODY: NORMAL
HEPATITIS B SURFACE ANTIGEN INTERPRETATION: NORMAL
HEPATITIS C ANTIBODY INTERPRETATION: NORMAL
SEDIMENTATION RATE, ERYTHROCYTE: 29 MM/HR (ref 0–20)

## 2018-11-28 PROCEDURE — G8427 DOCREV CUR MEDS BY ELIG CLIN: HCPCS | Performed by: INTERNAL MEDICINE

## 2018-11-28 PROCEDURE — 99245 OFF/OP CONSLTJ NEW/EST HI 55: CPT | Performed by: INTERNAL MEDICINE

## 2018-11-28 PROCEDURE — G8484 FLU IMMUNIZE NO ADMIN: HCPCS | Performed by: INTERNAL MEDICINE

## 2018-11-28 PROCEDURE — G8417 CALC BMI ABV UP PARAM F/U: HCPCS | Performed by: INTERNAL MEDICINE

## 2018-11-28 PROCEDURE — 73630 X-RAY EXAM OF FOOT: CPT

## 2018-11-28 PROCEDURE — 72202 X-RAY EXAM SI JOINTS 3/> VWS: CPT

## 2018-11-28 RX ORDER — SULFASALAZINE 500 MG/1
500 TABLET ORAL 2 TIMES DAILY
Qty: 180 TABLET | Refills: 1 | Status: SHIPPED | OUTPATIENT
Start: 2018-11-28 | End: 2020-08-14 | Stop reason: ALTCHOICE

## 2018-11-28 RX ORDER — COVID-19 ANTIGEN TEST
1 KIT MISCELLANEOUS
COMMUNITY

## 2018-11-28 NOTE — PROGRESS NOTES
[address], [place]                                                           [address], [address] [place] [address] [phone] (P) [phone] (F)      Dear Dr. Lisa Ireland MD:  Please find Rheumatology assessment. Thank you for giving me the opportunity to be involved in Girish Stubbs's care and I look forward following Girish Hartley along with you. If you have any questions or concerns please feel free to reach me. Note is transcribed using voice recognition software. Inadvertent computerized transcription errors may be present. Patient identification: Sydni Stubbs,: ,11 y.o. Sex: female     Assessment / Plan:  Girish Hartley was seen today for joint swelling and joint pain. Diagnoses and all orders for this visit:    Psoriasis    Polyarthralgia  -     C-Reactive Protein; Future  -     Sedimentation Rate; Future  -     Cyclic Citrul Peptide Antibody, IgG; Future  -     Hepatitis C Antibody; Future  -     Hepatitis B Surface Antigen; Future  -     Hepatitis B Core Antibody, IgM; Future  -     Hepatitis B Surface Antibody; Future  -     XR FOOT LEFT (MIN 3 VIEWS); Future  -     XR FOOT RIGHT (MIN 3 VIEWS); Future  -     XR SACROILIAC JOINTS (MIN 3 VIEWS); Future    Leg swelling    BMI 50.0-59.9, adult (HCC)  -     [facility]    Primary osteoarthritis involving multiple joints    Other orders  -     sulfaSALAzine (AZULFIDINE) 500 MG tablet; Take 1 tablet by mouth 2 times daily Start with 1 tab BID, then slowly  Increase 3 tab twice a day  as tolerated.         History, physical examination is suggestive of inflammatory arthritis mainly in her finger joints, and ankle and feet joints, no objective findings other than enthesitis left ankle and left peroneal tendon insertion area. She also has osteoarthritis causing low back pain, pain in her weightbearing joints and lower extremities. Skin psoriasis limited to her plantar aspect of both feet. Chronic leg swelling-unrelated to psoriasis or psoriatic arthritis. Changes are likely related to obesity/venous stasis. Plan-  Given the history suggestive of possible inflammatory arthritis, we discussed about utilizing sulfasalazine, side effects and directions were discussed with patient mainly GI side effects. Labs today, repeat labs in 6-8 weeks. Continue Aleve p.r.n. Continue topical tacrolimus cream for psoriasis -prescribed by dermatology- Dr Atif Oneill. We also talked about weight management, she is interested in seeing about bariatric surgery team, referral was given. Follow-up in 2 months. Patient indicates understanding and agrees with the management plan. I reviewed patient's history, referral documents and electronic medical records. Copy of consult note is being routed electronically/faxed to referring physician. #######################################################################    REASON FOR CONSULTATION:  Patient is being seen at the request of  Nae Hill MD for evaluation of joint pain. HISTORY OF PRESENT ILLNESS:  51 y.o. female  with obstructive sleep apnea, CPAP-for last 7 years, chronic bilateral leg swelling-plantar psoriasis-approximately 20 years, has been experiencing joint discomfort in her back for last 5 years, has been getting recurrent epidural injections with some relief in her symptoms. New symptoms-pain in her finger joints, ankle and feet-mainly across MCPs, PIPs, ankle and distal feet joints-AM stiffness 20-30 minutes, at times associated with swelling, stiffness, is able to make full fist in the morning, associated with discomfort.   She was taking ibuprofen, now taking Aleve 2

## 2018-11-30 LAB — CCP IGG ANTIBODIES: 5 UNITS (ref 0–19)

## 2019-10-08 ENCOUNTER — HOSPITAL ENCOUNTER (OUTPATIENT)
Dept: MAMMOGRAPHY | Age: 49
Discharge: HOME | End: 2019-10-13
Payer: COMMERCIAL

## 2019-10-08 DIAGNOSIS — Z12.31 VISIT FOR SCREENING MAMMOGRAM: ICD-10-CM

## 2019-10-08 PROCEDURE — 77063 BREAST TOMOSYNTHESIS BI: CPT

## 2019-11-26 ENCOUNTER — OFFICE VISIT (OUTPATIENT)
Dept: ORTHOPEDIC SURGERY | Age: 49
End: 2019-11-26
Payer: COMMERCIAL

## 2019-11-26 DIAGNOSIS — M25.561 RIGHT KNEE PAIN, UNSPECIFIED CHRONICITY: Primary | ICD-10-CM

## 2019-11-26 DIAGNOSIS — M17.0 PRIMARY OSTEOARTHRITIS OF BOTH KNEES: ICD-10-CM

## 2019-11-26 PROCEDURE — 20610 DRAIN/INJ JOINT/BURSA W/O US: CPT | Performed by: ORTHOPAEDIC SURGERY

## 2019-11-26 RX ORDER — METHYLPREDNISOLONE ACETATE 40 MG/ML
40 INJECTION, SUSPENSION INTRA-ARTICULAR; INTRALESIONAL; INTRAMUSCULAR; SOFT TISSUE ONCE
Status: COMPLETED | OUTPATIENT
Start: 2019-11-26 | End: 2019-11-26

## 2019-11-26 RX ADMIN — METHYLPREDNISOLONE ACETATE 40 MG: 40 INJECTION, SUSPENSION INTRA-ARTICULAR; INTRALESIONAL; INTRAMUSCULAR; SOFT TISSUE at 14:42

## 2020-02-29 ENCOUNTER — APPOINTMENT (OUTPATIENT)
Dept: GENERAL RADIOLOGY | Age: 50
End: 2020-02-29
Payer: COMMERCIAL

## 2020-02-29 ENCOUNTER — HOSPITAL ENCOUNTER (EMERGENCY)
Age: 50
Discharge: HOME | End: 2020-02-29
Payer: COMMERCIAL

## 2020-02-29 VITALS
OXYGEN SATURATION: 98 % | SYSTOLIC BLOOD PRESSURE: 131 MMHG | RESPIRATION RATE: 16 BRPM | TEMPERATURE: 98.4 F | HEART RATE: 64 BPM | DIASTOLIC BLOOD PRESSURE: 60 MMHG | WEIGHT: 293 LBS | HEIGHT: 65 IN | BODY MASS INDEX: 48.82 KG/M2

## 2020-02-29 PROCEDURE — 73590 X-RAY EXAM OF LOWER LEG: CPT

## 2020-02-29 PROCEDURE — 6370000000 HC RX 637 (ALT 250 FOR IP): Performed by: NURSE PRACTITIONER

## 2020-02-29 PROCEDURE — 99283 EMERGENCY DEPT VISIT LOW MDM: CPT

## 2020-02-29 PROCEDURE — 73610 X-RAY EXAM OF ANKLE: CPT

## 2020-02-29 PROCEDURE — 73552 X-RAY EXAM OF FEMUR 2/>: CPT

## 2020-02-29 RX ORDER — ACETAMINOPHEN 325 MG/1
650 TABLET ORAL ONCE
Status: COMPLETED | OUTPATIENT
Start: 2020-02-29 | End: 2020-02-29

## 2020-02-29 RX ORDER — ONDANSETRON 4 MG/1
4 TABLET, ORALLY DISINTEGRATING ORAL ONCE
Status: COMPLETED | OUTPATIENT
Start: 2020-02-29 | End: 2020-02-29

## 2020-02-29 RX ADMIN — ONDANSETRON 4 MG: 4 TABLET, ORALLY DISINTEGRATING ORAL at 16:45

## 2020-02-29 RX ADMIN — ACETAMINOPHEN 650 MG: 325 TABLET ORAL at 16:46

## 2020-02-29 ASSESSMENT — PAIN SCALES - GENERAL
PAINLEVEL_OUTOF10: 7
PAINLEVEL_OUTOF10: 6

## 2020-02-29 NOTE — ED PROVIDER NOTES
[facility] Emergency Department    CHIEF COMPLAINT  Knee Injury (left slipped yesterday on a  landed on knee pain also in back of knee) and Ankle Injury (left ankle from fall yesterday )      Nick1 Sanjuanita Sargent is a 47 y.o. female who presents to the ED complaining of pain from fall yesterday. Patient reports that she slipped on the ice injuring her left. Patient reports pain since then. Patient reports taking ibuprofen with no relief. Patient reports minimal ability to bear weight during ambulation. No head injury or loss conscious. No dizziness or lightheadedness. No chest pain or shortness of breath. No numbness or tingling in extremities. No unilateral weakness. Patient reports that pain radiates from her upper leg and down to her ankle. Patient reports that she has started feeling nauseous because of the pain. Patient denies any vomiting or diarrhea. No other complaints, modifying factors or associated symptoms. Nursing notes reviewed.    Past Medical History:   Diagnosis Date    Back pain     Kienbock disease, adult     Kienbock disease, adult 2002    Kienbock disease, adult 2007    Kienbock disease, adult 2007    Localized osteoarthritis of left knee 1/24/2018    Other synovitis and tenosynovitis, unspecified ankle and foot 8/8/2017    Reflux     Sinusitis     Sleep apnea     NOT USING     Past Surgical History:   Procedure Laterality Date    APPENDECTOMY      BREAST SURGERY      bilateral lumpectomy    CARPAL TUNNEL RELEASE      bilateral    CHOLECYSTECTOMY      HAND SURGERY Left     HAND    HYSTEROSCOPY      KNEE SURGERY Left     MENISCUS    NASAL SEPTUM SURGERY  08/2018    OTHER SURGICAL HISTORY Left 01/26/2018    left knee DVA with LBR and abrasion arthroplasty     Family History   Problem Relation Age of Onset    Cancer Mother         BREAST    Cancer Maternal Aunt         COLON    Heart Disease Maternal Aunt Social History     Socioeconomic History    Marital status:      Spouse name: Not on file    Number of children: Not on file    Years of education: Not on file    Highest education level: Not on file   Occupational History    Not on file   Social Needs    Financial resource strain: Not on file    Food insecurity:     Worry: Not on file     Inability: Not on file    Transportation needs:     Medical: Not on file     Non-medical: Not on file   Tobacco Use    Smoking status: Never Smoker    Smokeless tobacco: Never Used   Substance and Sexual Activity    Alcohol use: No    Drug use: No    Sexual activity: Yes     Partners: Male   Lifestyle    Physical activity:     Days per week: Not on file     Minutes per session: Not on file    Stress: Not on file   Relationships    Social connections:     Talks on phone: Not on file     Gets together: Not on file     Attends Quaker service: Not on file     Active member of club or organization: Not on file     Attends meetings of clubs or organizations: Not on file     Relationship status: Not on file    Intimate partner violence:     Fear of current or ex partner: Not on file     Emotionally abused: Not on file     Physically abused: Not on file     Forced sexual activity: Not on file   Other Topics Concern    Not on file   Social History Narrative    Not on file     No current facility-administered medications for this encounter. Current Outpatient Medications   Medication Sig Dispense Refill    Naproxen Sodium (ALEVE) 220 MG CAPS Take 1 tablet by mouth      KLOR-CON 10 10 MEQ extended release tablet TAKE 1 TABLET BY MOUTH 2 (TWO) TIMES DAILY., ON DAYS TAKING LASIX  3    furosemide (LASIX) 20 MG tablet daily as needed       sulfaSALAzine (AZULFIDINE) 500 MG tablet Take 1 tablet by mouth 2 times daily Start with 1 tab BID, then slowly  Increase 3 tab twice a day  as tolerated.  180 tablet 1     Allergies   Allergen Reactions    No Known could not be visualized due to patient habitus. If there is concern for injury of the left hip/proximal femur dedicated radiographs of the left hip would be recommended. No evidence of fracture at the knee. There are mild-moderate osteoarthritic changes of the knee. No dislocation     Xr Tibia Fibula Left (2 Views)    Result Date: 2/29/2020  EXAMINATION: THREE XRAY VIEWS OF THE LEFT ANKLE; 2 XRAY VIEWS OF THE LEFT TIBIA AND FIBULA 2/29/2020 12:33 pm; 2/29/2020 12:37 pm COMPARISON: None. HISTORY: ORDERING SYSTEM PROVIDED HISTORY: injury TECHNOLOGIST PROVIDED HISTORY: Reason for exam:->injury Reason for Exam: Knee Injury (left slipped yesterday on a  landed on knee pain also in back of knee); ORDERING SYSTEM PROVIDED HISTORY: pain, fall TECHNOLOGIST PROVIDED HISTORY: Reason for exam:->pain, fall Reason for Exam: Knee Injury (left slipped yesterday on a  landed on knee pain also in back of knee) FINDINGS: Left tibia and fibula: No stress, insufficiency, or traumatic fractures identified. There is circumferential soft tissue swelling. No soft tissue gas or radiopaque foreign body is identified. Left ankle: The medial and lateral malleoli are intact. The ankle mortise is congruent. Talar dome is intact. The subtalar joint is unremarkable, with maintenance of the anterior process of calcaneus. Mild calcaneal enthesopathy noted. Mild degenerative changes within the midfoot seen. There is circumferential soft tissue swelling. Left tibia and fibula: Circumferential soft tissue swelling. No bony abnormality. Left ankle: Circumferential soft tissue swelling. No acute bony abnormality. No dislocation. Xr Ankle Left (min 3 Views)    Result Date: 2/29/2020  EXAMINATION: THREE XRAY VIEWS OF THE LEFT ANKLE; 2 XRAY VIEWS OF THE LEFT TIBIA AND FIBULA 2/29/2020 12:33 pm; 2/29/2020 12:37 pm COMPARISON: None.  HISTORY: ORDERING SYSTEM PROVIDED HISTORY: injury TECHNOLOGIST PROVIDED HISTORY: Reason for exam:->injury Reason for Exam: Knee Injury (left slipped yesterday on a  landed on knee pain also in back of knee); ORDERING SYSTEM PROVIDED HISTORY: pain, fall TECHNOLOGIST PROVIDED HISTORY: Reason for exam:->pain, fall Reason for Exam: Knee Injury (left slipped yesterday on a  landed on knee pain also in back of knee) FINDINGS: Left tibia and fibula: No stress, insufficiency, or traumatic fractures identified. There is circumferential soft tissue swelling. No soft tissue gas or radiopaque foreign body is identified. Left ankle: The medial and lateral malleoli are intact. The ankle mortise is congruent. Talar dome is intact. The subtalar joint is unremarkable, with maintenance of the anterior process of calcaneus. Mild calcaneal enthesopathy noted. Mild degenerative changes within the midfoot seen. There is circumferential soft tissue swelling. Left tibia and fibula: Circumferential soft tissue swelling. No bony abnormality. Left ankle: Circumferential soft tissue swelling. No acute bony abnormality. No dislocation. ED COURSE   I have evaluated this patient on my own per my scope of practice. Supervising physician available during ED course of treatment for consultation. Vital signs stable. Patient was given Tylenol for pain and Zofran for nausea with good relief. Radiological imaging of the left femur revealed no evidence of fracture although the proximal femur cannot be visualized due to body habitus patient denies any hip pain upon palpation therefore no further imaging was obtained per radiologist.  Imaging of the left tibia-fibula and ankle revealed soft tissue swelling but no bony abnormalities per radiologist.  Patient was instructed on RICE as well as alternating ibuprofen and Tylenol for pain. A discussion was had with Ms. Brenda Ramirez regarding ED findings, results and follow up. All questions were answered.  Patient will follow up with  PCP and [facility]  for

## 2020-06-01 ENCOUNTER — OFFICE VISIT (OUTPATIENT)
Dept: ORTHOPEDIC SURGERY | Age: 50
End: 2020-06-01
Payer: COMMERCIAL

## 2020-06-01 VITALS — WEIGHT: 293 LBS | BODY MASS INDEX: 48.82 KG/M2 | HEIGHT: 65 IN

## 2020-06-01 PROCEDURE — 3017F COLORECTAL CA SCREEN DOC REV: CPT | Performed by: ORTHOPAEDIC SURGERY

## 2020-06-01 PROCEDURE — 20610 DRAIN/INJ JOINT/BURSA W/O US: CPT | Performed by: ORTHOPAEDIC SURGERY

## 2020-06-01 PROCEDURE — G8427 DOCREV CUR MEDS BY ELIG CLIN: HCPCS | Performed by: ORTHOPAEDIC SURGERY

## 2020-06-01 PROCEDURE — 99214 OFFICE O/P EST MOD 30 MIN: CPT | Performed by: ORTHOPAEDIC SURGERY

## 2020-06-01 PROCEDURE — G8417 CALC BMI ABV UP PARAM F/U: HCPCS | Performed by: ORTHOPAEDIC SURGERY

## 2020-06-01 PROCEDURE — 1036F TOBACCO NON-USER: CPT | Performed by: ORTHOPAEDIC SURGERY

## 2020-06-01 RX ORDER — SULINDAC 200 MG/1
200 TABLET ORAL 2 TIMES DAILY
Qty: 60 TABLET | Refills: 3 | Status: SHIPPED | OUTPATIENT
Start: 2020-06-01

## 2020-06-01 RX ORDER — METHYLPREDNISOLONE ACETATE 40 MG/ML
40 INJECTION, SUSPENSION INTRA-ARTICULAR; INTRALESIONAL; INTRAMUSCULAR; SOFT TISSUE ONCE
Status: SHIPPED | OUTPATIENT
Start: 2020-06-01

## 2020-06-01 NOTE — PROGRESS NOTES
KNEE VISIT      HISTORY OF PRESENT ILLNESS    Prisca Asencio is a 45 y.o. female who presents for evaluation of left knee pain that she has had for the last 3 to 4 months, since February she had some issues and slipped with her knee and has some pain and has a few episodes since that has caused problems. She has had a few arthroscopic interventions to the left knee over the years but not for quite some time. She had been doing very well up until recently when all this occurred. Intermittent, she grades her pain 6-7 over 10 at worst.  She has stiffness and some catching and locking mostly laterally and posterior laterally. ROS    Well-documented patient history form dated 6/1/2020  All other ROS negative except for above. Past Surgical history    Past Surgical History:   Procedure Laterality Date    APPENDECTOMY      BREAST SURGERY      bilateral lumpectomy    CARPAL TUNNEL RELEASE      bilateral    CHOLECYSTECTOMY      HAND SURGERY Left     HAND    HYSTEROSCOPY      KNEE SURGERY Left     MENISCUS    NASAL SEPTUM SURGERY  08/2018    OTHER SURGICAL HISTORY Left 01/26/2018    left knee DVA with LBR and abrasion arthroplasty       PAST MEDICAL    Past Medical History:   Diagnosis Date    Back pain     Kienbock disease, adult     Kienbock disease, adult 2002    Kienbock disease, adult 2007    Kienbock disease, adult 2007    Localized osteoarthritis of left knee 1/24/2018    Other synovitis and tenosynovitis, unspecified ankle and foot 8/8/2017    Reflux     Sinusitis     Sleep apnea     NOT USING       Allergies    Allergies   Allergen Reactions    No Known Allergies        Meds    Current Outpatient Medications   Medication Sig Dispense Refill    Naproxen Sodium (ALEVE) 220 MG CAPS Take 1 tablet by mouth      sulfaSALAzine (AZULFIDINE) 500 MG tablet Take 1 tablet by mouth 2 times daily Start with 1 tab BID, then slowly  Increase 3 tab twice a day  as tolerated.  180 tablet 1    KLOR-CON 10 10 MEQ extended release tablet TAKE 1 TABLET BY MOUTH 2 (TWO) TIMES DAILY., ON DAYS TAKING LASIX  3    furosemide (LASIX) 20 MG tablet daily as needed        No current facility-administered medications for this visit. Social    Social History     Socioeconomic History    Marital status:      Spouse name: Not on file    Number of children: Not on file    Years of education: Not on file    Highest education level: Not on file   Occupational History    Not on file   Social Needs    Financial resource strain: Not on file    Food insecurity     Worry: Not on file     Inability: Not on file    Transportation needs     Medical: Not on file     Non-medical: Not on file   Tobacco Use    Smoking status: Never Smoker    Smokeless tobacco: Never Used   Substance and Sexual Activity    Alcohol use: No    Drug use: No    Sexual activity: Yes     Partners: Male   Lifestyle    Physical activity     Days per week: Not on file     Minutes per session: Not on file    Stress: Not on file   Relationships    Social connections     Talks on phone: Not on file     Gets together: Not on file     Attends Catholic service: Not on file     Active member of club or organization: Not on file     Attends meetings of clubs or organizations: Not on file     Relationship status: Not on file    Intimate partner violence     Fear of current or ex partner: Not on file     Emotionally abused: Not on file     Physically abused: Not on file     Forced sexual activity: Not on file   Other Topics Concern    Not on file   Social History Narrative    Not on file       Family HISTORY    Family History   Problem Relation Age of Onset    Cancer Mother         BREAST    Cancer Maternal Aunt         COLON    Heart Disease Maternal Aunt        PHYSICAL EXAM    Vital Signs:  Ht 5' 5\" (1.651 m)   Wt (!) 325 lb (147.4 kg)   BMI 54.08 kg/m²   General Appearance: Obese body habitus with a BMI of 54.  Alert and oriented to person, place, and time. Affect:  Normal.   Gait:  Normal. Good balance and coordination. Skin:  Intact. Sensation:  Intact. Strength:  Intact. Reflexes:  Intact. Pulses:  Intact. Knee Exam:    Effusion: Trace    Range of Motion Right Left   Extension 0 -5   Flexion 110 105     Provocative Test Right Left    Positive Negative Positive Negative   Anterior drawer [] [x] [] [x]   Lachman [] [x] [] [x]   Posterior drawer [] [x] [] [x]   Varus testing [] [x] [] [x]   Valgus testing [] [x] [x] []   Joint line tenderness [] [x] [x] []     Additional Exam Comments: Her neurocirculatory lymphatic exam otherwise is normal symmetric to both lower extremities. She does have pain when anterolateral joint line, and around the posterior lateral knee to direct palpation, varus stress, and Nabeel's maneuver. She has no gross instability. IMAGING STUDIES    X-rays 3 views of her left knee reveals some minimal degenerative change mostly medial and patellofemoral joints. IMPRESSION    Left knee pain secondary to degenerative lateral meniscus tear acute on chronic    PLAN      1. Conservative care options including physical therapy, NSAIDs, bracing, and activity modification were discussed. 2.  The indications for therapeutic injections were discussed. 3.  The indications for additional imaging studies were discussed. 4.  After considering the various options discussed, the patient elected to pursue a course that includes a cortisone injection left knee, some Clinoril to take as anti-inflammatory and a physician directed therapy program.  If not substantial improved over the next several days she may benefit from scanning. Recommendation is for a cortisone injection into the left knee.  After informed consent was received from the patient, the left knee was injected with 1 mL( 40mg) Depo-Medrol and 4 mL  of 0.25% Marcaine in the syringe from an anterolateral joint line approach, using a 25-gauge needle, under sterile Betadine prep, using ethyl chloride as a topical refrigerant, for a diagnosis of osteoarthritis. The patient appeared to tolerate it well. The patient should return here periodically as needed. Encounter Diagnosis   Name Primary?     Acute pain of left knee Yes        Orders Placed This Encounter   Procedures    XR KNEE LEFT (MIN 4 VIEWS)     Standing Status:   Future     Number of Occurrences:   1     Standing Expiration Date:   6/1/2021

## 2020-07-09 ENCOUNTER — TELEPHONE (OUTPATIENT)
Dept: ORTHOPEDIC SURGERY | Age: 50
End: 2020-07-09

## 2020-07-21 ENCOUNTER — OFFICE VISIT (OUTPATIENT)
Dept: ORTHOPEDIC SURGERY | Age: 50
End: 2020-07-21
Payer: COMMERCIAL

## 2020-07-21 VITALS — BODY MASS INDEX: 48.82 KG/M2 | HEIGHT: 65 IN | RESPIRATION RATE: 12 BRPM | WEIGHT: 293 LBS

## 2020-07-21 PROBLEM — M22.42 CHONDROMALACIA PATELLAE OF LEFT KNEE: Status: ACTIVE | Noted: 2020-07-21

## 2020-07-21 PROCEDURE — G8417 CALC BMI ABV UP PARAM F/U: HCPCS | Performed by: ORTHOPAEDIC SURGERY

## 2020-07-21 PROCEDURE — G8427 DOCREV CUR MEDS BY ELIG CLIN: HCPCS | Performed by: ORTHOPAEDIC SURGERY

## 2020-07-21 PROCEDURE — 3017F COLORECTAL CA SCREEN DOC REV: CPT | Performed by: ORTHOPAEDIC SURGERY

## 2020-07-21 PROCEDURE — 1036F TOBACCO NON-USER: CPT | Performed by: ORTHOPAEDIC SURGERY

## 2020-07-21 PROCEDURE — E0114 CRUTCH UNDERARM PAIR NO WOOD: HCPCS | Performed by: ORTHOPAEDIC SURGERY

## 2020-07-21 PROCEDURE — 99213 OFFICE O/P EST LOW 20 MIN: CPT | Performed by: ORTHOPAEDIC SURGERY

## 2020-07-21 PROCEDURE — 20610 DRAIN/INJ JOINT/BURSA W/O US: CPT | Performed by: ORTHOPAEDIC SURGERY

## 2020-07-21 RX ORDER — BUPIVACAINE HYDROCHLORIDE 2.5 MG/ML
7 INJECTION, SOLUTION INFILTRATION; PERINEURAL ONCE
Status: COMPLETED | OUTPATIENT
Start: 2020-07-21 | End: 2020-07-21

## 2020-07-21 RX ORDER — METHYLPREDNISOLONE ACETATE 40 MG/ML
40 INJECTION, SUSPENSION INTRA-ARTICULAR; INTRALESIONAL; INTRAMUSCULAR; SOFT TISSUE ONCE
Status: COMPLETED | OUTPATIENT
Start: 2020-07-21 | End: 2020-07-21

## 2020-07-21 RX ADMIN — METHYLPREDNISOLONE ACETATE 40 MG: 40 INJECTION, SUSPENSION INTRA-ARTICULAR; INTRALESIONAL; INTRAMUSCULAR; SOFT TISSUE at 14:46

## 2020-07-21 RX ADMIN — BUPIVACAINE HYDROCHLORIDE 17.5 MG: 2.5 INJECTION, SOLUTION INFILTRATION; PERINEURAL at 14:46

## 2020-07-21 NOTE — PROGRESS NOTES
KNEE VISIT      HISTORY OF PRESENT ILLNESS    Ioana Chacon is a 45 y.o. female who presents for evaluation of left knee pain. She still has patellofemoral pain as well as lateral and posterior lateral pain. She has pain with twisting and all signs and symptoms consistent with lateral meniscal pathology. She has had her MRI and is here for review. ROS    Well-documented patient history form dated 6/1/2020  All other ROS negative except for above. Past Surgical history    Past Surgical History:   Procedure Laterality Date    APPENDECTOMY      BREAST SURGERY      bilateral lumpectomy    CARPAL TUNNEL RELEASE      bilateral    CHOLECYSTECTOMY      HAND SURGERY Left     HAND    HYSTEROSCOPY      KNEE SURGERY Left     MENISCUS    NASAL SEPTUM SURGERY  08/2018    OTHER SURGICAL HISTORY Left 01/26/2018    left knee DVA with LBR and abrasion arthroplasty       PAST MEDICAL    Past Medical History:   Diagnosis Date    Back pain     Kienbock disease, adult     Kienbock disease, adult 2002    Kienbock disease, adult 2007    Kienbock disease, adult 2007    Localized osteoarthritis of left knee 1/24/2018    Other synovitis and tenosynovitis, unspecified ankle and foot 8/8/2017    Reflux     Sinusitis     Sleep apnea     NOT USING       Allergies    Allergies   Allergen Reactions    No Known Allergies        Meds    Current Outpatient Medications   Medication Sig Dispense Refill    sulindac (CLINORIL) 200 MG tablet Take 1 tablet by mouth 2 times daily 60 tablet 3    Naproxen Sodium (ALEVE) 220 MG CAPS Take 1 tablet by mouth      sulfaSALAzine (AZULFIDINE) 500 MG tablet Take 1 tablet by mouth 2 times daily Start with 1 tab BID, then slowly  Increase 3 tab twice a day  as tolerated.  180 tablet 1    KLOR-CON 10 10 MEQ extended release tablet TAKE 1 TABLET BY MOUTH 2 (TWO) TIMES DAILY., ON DAYS TAKING LASIX  3    furosemide (LASIX) 20 MG tablet daily as needed        Current Facility-Administered Medications   Medication Dose Route Frequency Provider Last Rate Last Dose    methylPREDNISolone acetate (DEPO-MEDROL) injection 40 mg  40 mg Intramuscular Once Jennifer Corona MD           Social    Social History     Socioeconomic History    Marital status:      Spouse name: Not on file    Number of children: Not on file    Years of education: Not on file    Highest education level: Not on file   Occupational History    Not on file   Social Needs    Financial resource strain: Not on file    Food insecurity     Worry: Not on file     Inability: Not on file    Transportation needs     Medical: Not on file     Non-medical: Not on file   Tobacco Use    Smoking status: Never Smoker    Smokeless tobacco: Never Used   Substance and Sexual Activity    Alcohol use: No    Drug use: No    Sexual activity: Yes     Partners: Male   Lifestyle    Physical activity     Days per week: Not on file     Minutes per session: Not on file    Stress: Not on file   Relationships    Social connections     Talks on phone: Not on file     Gets together: Not on file     Attends Episcopalian service: Not on file     Active member of club or organization: Not on file     Attends meetings of clubs or organizations: Not on file     Relationship status: Not on file    Intimate partner violence     Fear of current or ex partner: Not on file     Emotionally abused: Not on file     Physically abused: Not on file     Forced sexual activity: Not on file   Other Topics Concern    Not on file   Social History Narrative    Not on file       Family HISTORY    Family History   Problem Relation Age of Onset    Cancer Mother         BREAST    Cancer Maternal Aunt         COLON    Heart Disease Maternal Aunt        PHYSICAL EXAM    Vital Signs:  Resp 12   Ht 5' 5\" (1.651 m)   Wt (!) 324 lb 15.3 oz (147.4 kg)   BMI 54.08 kg/m²   General Appearance: Obese body habitus with a BMI of 54.  Alert and oriented to person, place, and time. Affect:  Normal.   Gait:  Normal. Good balance and coordination. Skin:  Intact. Sensation:  Intact. Strength:  Intact. Reflexes:  Intact. Pulses:  Intact. Knee Exam:    Effusion: Trace    Range of Motion Right Left   Extension 0 -5   Flexion 110 105     Provocative Test Right Left    Positive Negative Positive Negative   Anterior drawer [] [x] [] [x]   Lachman [] [x] [] [x]   Posterior drawer [] [x] [] [x]   Varus testing [] [x] [] [x]   Valgus testing [] [x] [x] []   Joint line tenderness [] [x] [x] []     Additional Exam Comments: Her neurocirculatory lymphatic exam otherwise is normal symmetric to both lower extremities. She does have pain when anterolateral joint line, and around the posterior lateral knee to direct palpation, varus stress, and Nabeel's maneuver. She has no gross instability. IMAGING STUDIES    I reviewed the MRI of her left knee which reveals some osteoarthritis some chondromalacia patella and to my view potentially lateral meniscus tear. IMPRESSION    Left knee pain secondary to degenerative lateral meniscus tear acute on chronic with chondromalacia patella    PLAN      1. Conservative care options including physical therapy, NSAIDs, bracing, and activity modification were discussed. 2.  The indications for therapeutic injections were discussed. 3.  The indications for additional imaging studies were discussed. 4.  After considering the various options discussed, the patient elected to pursue a course that includes a cortisone injection left knee, and tentatively scheduled for left knee video arthroscopy for 3 weeks from now. If the injection does not help then I recommend proceeding with the surgery. Recommendation is for a cortisone injection into the left knee.  After informed consent was received from the patient, the left knee was injected with 1 mL( 40mg) Depo-Medrol and 4 mL  of 0.25% Marcaine in the syringe from an anterolateral joint line approach, using a 25-gauge needle, under sterile Betadine prep, using ethyl chloride as a topical refrigerant, for a diagnosis of osteoarthritis. The patient appeared to tolerate it well. The patient should return here periodically as needed. Encounter Diagnoses   Name Primary?  Acute pain of left knee     Primary osteoarthritis of both knees     Chondromalacia patellae of left knee Yes        Orders Placed This Encounter   Procedures    CO ARTHROCENTESIS ASPIR&/INJ MAJOR JT/BURSA W/O US        INFORMED CONSENT NOTE        We discussed the risks, benefits, and alternatives to the proposed procedure, as well as the necessity of other members of the healthcare team participating in the procedure. All questions were answered and the patient elected to proceed with the proposed procedure and signed the informed consent form. The patient was counseled at length about the risks of dc Covid-19 during their perioperative period and any recovery window from their procedure. The patient was made aware that dc Covid-19  may worsen their prognosis for recovering from their procedure  and lend to a higher morbidity and/or mortality risk. All material risks, benefits, and reasonable alternatives including postponing the procedure were discussed. The patient does wish to proceed with the procedure at this time.

## 2020-08-11 ENCOUNTER — TELEPHONE (OUTPATIENT)
Dept: ORTHOPEDIC SURGERY | Age: 50
End: 2020-08-11

## 2020-08-11 ENCOUNTER — HOSPITAL ENCOUNTER (OUTPATIENT)
Age: 50
Discharge: HOME | End: 2020-08-11
Payer: COMMERCIAL

## 2020-08-11 PROCEDURE — U0003 INFECTIOUS AGENT DETECTION BY NUCLEIC ACID (DNA OR RNA); SEVERE ACUTE RESPIRATORY SYNDROME CORONAVIRUS 2 (SARS-COV-2) (CORONAVIRUS DISEASE [COVID-19]), AMPLIFIED PROBE TECHNIQUE, MAKING USE OF HIGH THROUGHPUT TECHNOLOGIES AS DESCRIBED BY CMS-2020-01-R: HCPCS

## 2020-08-11 NOTE — TELEPHONE ENCOUNTER
Swetha Grimes [id number]    Date: 08/17/20  Type of SX:  OUTPATIENT  [facility] [place] [facility]  CPT: 52655   DX Code: M22.42  SX area: L KNEE  Insurance: [facility]

## 2020-08-12 ENCOUNTER — ANESTHESIA EVENT (OUTPATIENT)
Dept: OPERATING ROOM | Age: 50
End: 2020-08-12
Payer: COMMERCIAL

## 2020-08-12 LAB — SARS-COV-2, NAA: NOT DETECTED

## 2020-08-13 ASSESSMENT — LIFESTYLE VARIABLES: SMOKING_STATUS: 0

## 2020-08-13 NOTE — ANESTHESIA PRE PROCEDURE
Department of Anesthesiology  Preprocedure Note       Name:  Cynthia Rodriguez   Age:  45 y.o.  :  1970                                          MRN:  [id number]         Date:  2020      Surgeon: Lety Solomon):  Tate Easley MD    Procedure: Procedure(s):  LEFT KNEE VIDEO ARTHROSCOPY, CHONDROPLASTY OF PATELLA    Medications prior to admission:   Prior to Admission medications    Medication Sig Start Date End Date Taking?  Authorizing Provider   cyclobenzaprine (FLEXERIL) 5 MG tablet Take 5 mg by mouth 3 times daily as needed for Muscle spasms   Yes Historical Provider, MD   KLOR-CON 10 10 MEQ extended release tablet TAKE 1 TABLET BY MOUTH 2 (TWO) TIMES DAILY., ON DAYS TAKING LASIX 17  Yes Historical Provider, MD   furosemide (LASIX) 20 MG tablet daily as needed  3/27/17  Yes Historical Provider, MD   sulindac (CLINORIL) 200 MG tablet Take 1 tablet by mouth 2 times daily 20   Tate Easley MD   Naproxen Sodium (ALEVE) 220 MG CAPS Take 1 tablet by mouth    Historical Provider, MD       Current medications:    Current Facility-Administered Medications   Medication Dose Route Frequency Provider Last Rate Last Dose    lactated ringers infusion   Intravenous Continuous Bonifacio Blackwell  mL/hr at 20 0857      sodium chloride flush 0.9 % injection 10 mL  10 mL Intravenous 2 times per day Bonifacio Blackwell MD        sodium chloride flush 0.9 % injection 10 mL  10 mL Intravenous PRN Bonifacio Blackwell MD        ceFAZolin (ANCEF) 3 g in dextrose 5 % 100 mL IVPB  3 g Intravenous Once Tate Easely MD        aprepitant (EMEND) capsule 40 mg  40 mg Oral Once Bonifacio Blackwell MD        HYDROmorphone (DILAUDID) injection 0.25 mg  0.25 mg Intravenous Q5 Min PRN Bonifacio Blackwell MD        HYDROmorphone (DILAUDID) injection 0.5 mg  0.5 mg Intravenous Q5 Min PRN Bonifacio Blackwell MD        morphine injection 1 mg  1 mg Intravenous Q5 Min PRN tobacco: Never Used   Substance Use Topics    Alcohol use: No                                Counseling given: Not Answered      Vital Signs (Current):   Vitals:    08/17/20 0849   BP: 120/73   Pulse: 72   Resp: 20   Temp: 97.4 °F (36.3 °C)   TempSrc: Temporal   SpO2: 97%   Weight: (!) 330 lb (149.7 kg)   Height: 5' 5\" (1.651 m)                                              BP Readings from Last 3 Encounters:   08/17/20 120/73   02/29/20 131/60   11/28/18 122/78       NPO Status: Time of last liquid consumption: 2355                        Time of last solid consumption: 1800                        Date of last liquid consumption: 08/16/20                        Date of last solid food consumption: 08/16/20    BMI:   Wt Readings from Last 3 Encounters:   08/17/20 (!) 330 lb (149.7 kg)   07/21/20 (!) 324 lb 15.3 oz (147.4 kg)   06/01/20 (!) 325 lb (147.4 kg)     Body mass index is 54.91 kg/m². CBC: No results found for: WBC, RBC, HGB, HCT, MCV, RDW, PLT    CMP:   Lab Results   Component Value Date     01/26/2018    K 4.0 01/26/2018     01/26/2018    CO2 26 01/26/2018    BUN 13 01/26/2018    CREATININE <0.5 01/26/2018    GFRAA >60 01/26/2018    LABGLOM >60 01/26/2018    GLUCOSE 85 01/26/2018    CALCIUM 8.6 01/26/2018       POC Tests: No results for input(s): POCGLU, POCNA, POCK, POCCL, POCBUN, POCHEMO, POCHCT in the last 72 hours.     Coags: No results found for: PROTIME, INR, APTT    HCG (If Applicable):   Lab Results   Component Value Date    PREGTESTUR Negative 08/17/2020        ABGs: No results found for: PHART, PO2ART, FWC7QBI, WWU3UDI, BEART, P5ETWGHF     Type & Screen (If Applicable):  No results found for: LABABO, LABRH    Drug/Infectious Status (If Applicable):  No results found for: HIV, HEPCAB    COVID-19 Screening (If Applicable):   Lab Results   Component Value Date    COVID19 NOT DETECTED 08/11/2020         Anesthesia Evaluation  Patient summary reviewed no history of anesthetic complications:   Airway: Mallampati: II  TM distance: >3 FB   Neck ROM: full  Mouth opening: > = 3 FB Dental:          Pulmonary: breath sounds clear to auscultation  (+) sleep apnea: on CPAP,      (-) asthma, shortness of breath and not a current smoker          Patient did not smoke on day of surgery. Cardiovascular:Negative CV ROS        (-) pacemaker, hypertension, past MI, CAD, CABG/stent, dysrhythmias,  angina and  CHF    ECG reviewed  Rhythm: regular  Rate: normal           Beta Blocker:  Not on Beta Blocker         Neuro/Psych:   Negative Neuro/Psych ROS     (-) seizures, TIA and CVA           GI/Hepatic/Renal:   (+) GERD (W/ DYSPHAGIA , OCC, NO MEDS):, morbid obesity     (-) liver disease and no renal disease       Endo/Other:    (+) : arthritis: OA., no malignancy/cancer. (-) diabetes mellitus, hypothyroidism, hyperthyroidism, blood dyscrasia, no malignancy/cancer               Abdominal:   (+) obese,     Abdomen: soft. Vascular: negative vascular ROS. Anesthesia Plan      general     ASA 3       Induction: intravenous. MIPS: Postoperative opioids intended and Prophylactic antiemetics administered. Anesthetic plan and risks discussed with patient. Plan discussed with CRNA. This pre-anesthesia assessment may be used as a history and physical.    DOS STAFF ADDENDUM:    Pt seen and examined, chart reviewed (including anesthesia, drug and allergy history). No interval changes to history and physical examination. Anesthetic plan, risks, benefits, alternatives, and personnel involved discussed with patient. Patient verbalized an understanding and agrees to proceed.       Lester Conley MD  August 17, 2020  9:14 AM          Lester Conley MD   8/17/2020

## 2020-08-14 RX ORDER — CYCLOBENZAPRINE HCL 5 MG
5 TABLET ORAL 3 TIMES DAILY PRN
COMMUNITY

## 2020-08-17 ENCOUNTER — ANESTHESIA (OUTPATIENT)
Dept: OPERATING ROOM | Age: 50
End: 2020-08-17
Payer: COMMERCIAL

## 2020-08-17 ENCOUNTER — HOSPITAL ENCOUNTER (OUTPATIENT)
Age: 50
Setting detail: OUTPATIENT SURGERY
Discharge: HOME | End: 2020-08-17
Attending: ORTHOPAEDIC SURGERY | Admitting: ORTHOPAEDIC SURGERY
Payer: COMMERCIAL

## 2020-08-17 VITALS
BODY MASS INDEX: 48.82 KG/M2 | DIASTOLIC BLOOD PRESSURE: 69 MMHG | HEART RATE: 71 BPM | OXYGEN SATURATION: 96 % | HEIGHT: 65 IN | TEMPERATURE: 97.4 F | WEIGHT: 293 LBS | SYSTOLIC BLOOD PRESSURE: 104 MMHG | RESPIRATION RATE: 18 BRPM

## 2020-08-17 VITALS
SYSTOLIC BLOOD PRESSURE: 134 MMHG | OXYGEN SATURATION: 94 % | RESPIRATION RATE: 15 BRPM | DIASTOLIC BLOOD PRESSURE: 69 MMHG

## 2020-08-17 LAB
POTASSIUM REFLEX MAGNESIUM: 3.9 MMOL/L (ref 3.5–5.1)
PREGNANCY, URINE: NEGATIVE

## 2020-08-17 PROCEDURE — 3700000001 HC ADD 15 MINUTES (ANESTHESIA): Performed by: ORTHOPAEDIC SURGERY

## 2020-08-17 PROCEDURE — 7100000001 HC PACU RECOVERY - ADDTL 15 MIN: Performed by: ORTHOPAEDIC SURGERY

## 2020-08-17 PROCEDURE — 7100000010 HC PHASE II RECOVERY - FIRST 15 MIN: Performed by: ORTHOPAEDIC SURGERY

## 2020-08-17 PROCEDURE — 7100000000 HC PACU RECOVERY - FIRST 15 MIN: Performed by: ORTHOPAEDIC SURGERY

## 2020-08-17 PROCEDURE — 84132 ASSAY OF SERUM POTASSIUM: CPT

## 2020-08-17 PROCEDURE — 6360000002 HC RX W HCPCS: Performed by: ORTHOPAEDIC SURGERY

## 2020-08-17 PROCEDURE — 3700000000 HC ANESTHESIA ATTENDED CARE: Performed by: ORTHOPAEDIC SURGERY

## 2020-08-17 PROCEDURE — 6370000000 HC RX 637 (ALT 250 FOR IP): Performed by: ANESTHESIOLOGY

## 2020-08-17 PROCEDURE — 3600000014 HC SURGERY LEVEL 4 ADDTL 15MIN: Performed by: ORTHOPAEDIC SURGERY

## 2020-08-17 PROCEDURE — 2580000003 HC RX 258: Performed by: ANESTHESIOLOGY

## 2020-08-17 PROCEDURE — 3600000004 HC SURGERY LEVEL 4 BASE: Performed by: ORTHOPAEDIC SURGERY

## 2020-08-17 PROCEDURE — 2500000003 HC RX 250 WO HCPCS: Performed by: ORTHOPAEDIC SURGERY

## 2020-08-17 PROCEDURE — 84703 CHORIONIC GONADOTROPIN ASSAY: CPT

## 2020-08-17 PROCEDURE — 36415 COLL VENOUS BLD VENIPUNCTURE: CPT

## 2020-08-17 PROCEDURE — 2500000003 HC RX 250 WO HCPCS: Performed by: ANESTHESIOLOGY

## 2020-08-17 PROCEDURE — 2580000003 HC RX 258: Performed by: ORTHOPAEDIC SURGERY

## 2020-08-17 PROCEDURE — 6360000002 HC RX W HCPCS: Performed by: NURSE ANESTHETIST, CERTIFIED REGISTERED

## 2020-08-17 PROCEDURE — 6360000002 HC RX W HCPCS: Performed by: ANESTHESIOLOGY

## 2020-08-17 PROCEDURE — 2709999900 HC NON-CHARGEABLE SUPPLY: Performed by: ORTHOPAEDIC SURGERY

## 2020-08-17 PROCEDURE — 2720000010 HC SURG SUPPLY STERILE: Performed by: ORTHOPAEDIC SURGERY

## 2020-08-17 PROCEDURE — 7100000011 HC PHASE II RECOVERY - ADDTL 15 MIN: Performed by: ORTHOPAEDIC SURGERY

## 2020-08-17 RX ORDER — SODIUM CHLORIDE 0.9 % (FLUSH) 0.9 %
10 SYRINGE (ML) INJECTION EVERY 12 HOURS SCHEDULED
Status: DISCONTINUED | OUTPATIENT
Start: 2020-08-17 | End: 2020-08-17 | Stop reason: HOSPADM

## 2020-08-17 RX ORDER — ONDANSETRON 2 MG/ML
4 INJECTION INTRAMUSCULAR; INTRAVENOUS
Status: COMPLETED | OUTPATIENT
Start: 2020-08-17 | End: 2020-08-17

## 2020-08-17 RX ORDER — SUCCINYLCHOLINE CHLORIDE 20 MG/ML
INJECTION INTRAMUSCULAR; INTRAVENOUS PRN
Status: DISCONTINUED | OUTPATIENT
Start: 2020-08-17 | End: 2020-08-17 | Stop reason: SDUPTHER

## 2020-08-17 RX ORDER — OXYCODONE HYDROCHLORIDE AND ACETAMINOPHEN 5; 325 MG/1; MG/1
1 TABLET ORAL PRN
Status: COMPLETED | OUTPATIENT
Start: 2020-08-17 | End: 2020-08-17

## 2020-08-17 RX ORDER — SODIUM CHLORIDE, SODIUM LACTATE, POTASSIUM CHLORIDE, CALCIUM CHLORIDE 600; 310; 30; 20 MG/100ML; MG/100ML; MG/100ML; MG/100ML
INJECTION, SOLUTION INTRAVENOUS CONTINUOUS
Status: DISCONTINUED | OUTPATIENT
Start: 2020-08-17 | End: 2020-08-17 | Stop reason: HOSPADM

## 2020-08-17 RX ORDER — MORPHINE SULFATE 10 MG/ML
2 INJECTION, SOLUTION INTRAMUSCULAR; INTRAVENOUS EVERY 5 MIN PRN
Status: DISCONTINUED | OUTPATIENT
Start: 2020-08-17 | End: 2020-08-17 | Stop reason: HOSPADM

## 2020-08-17 RX ORDER — OXYCODONE HYDROCHLORIDE AND ACETAMINOPHEN 5; 325 MG/1; MG/1
2 TABLET ORAL PRN
Status: COMPLETED | OUTPATIENT
Start: 2020-08-17 | End: 2020-08-17

## 2020-08-17 RX ORDER — HYDROCODONE BITARTRATE AND ACETAMINOPHEN 7.5; 325 MG/1; MG/1
1 TABLET ORAL EVERY 6 HOURS PRN
Qty: 28 TABLET | Refills: 0 | Status: SHIPPED | OUTPATIENT
Start: 2020-08-17 | End: 2020-08-24

## 2020-08-17 RX ORDER — KETOROLAC TROMETHAMINE 30 MG/ML
30 INJECTION, SOLUTION INTRAMUSCULAR; INTRAVENOUS ONCE
Status: COMPLETED | OUTPATIENT
Start: 2020-08-17 | End: 2020-08-17

## 2020-08-17 RX ORDER — FENTANYL CITRATE 50 UG/ML
INJECTION, SOLUTION INTRAMUSCULAR; INTRAVENOUS PRN
Status: DISCONTINUED | OUTPATIENT
Start: 2020-08-17 | End: 2020-08-17 | Stop reason: SDUPTHER

## 2020-08-17 RX ORDER — PROPOFOL 10 MG/ML
INJECTION, EMULSION INTRAVENOUS PRN
Status: DISCONTINUED | OUTPATIENT
Start: 2020-08-17 | End: 2020-08-17 | Stop reason: SDUPTHER

## 2020-08-17 RX ORDER — SODIUM CHLORIDE 0.9 % (FLUSH) 0.9 %
10 SYRINGE (ML) INJECTION PRN
Status: DISCONTINUED | OUTPATIENT
Start: 2020-08-17 | End: 2020-08-17 | Stop reason: HOSPADM

## 2020-08-17 RX ORDER — APREPITANT 40 MG/1
40 CAPSULE ORAL ONCE
Status: COMPLETED | OUTPATIENT
Start: 2020-08-17 | End: 2020-08-17

## 2020-08-17 RX ORDER — BUPIVACAINE HYDROCHLORIDE 2.5 MG/ML
INJECTION, SOLUTION INFILTRATION; PERINEURAL PRN
Status: DISCONTINUED | OUTPATIENT
Start: 2020-08-17 | End: 2020-08-17 | Stop reason: ALTCHOICE

## 2020-08-17 RX ORDER — MEPERIDINE HYDROCHLORIDE 25 MG/ML
12.5 INJECTION INTRAMUSCULAR; INTRAVENOUS; SUBCUTANEOUS EVERY 5 MIN PRN
Status: DISCONTINUED | OUTPATIENT
Start: 2020-08-17 | End: 2020-08-17 | Stop reason: HOSPADM

## 2020-08-17 RX ORDER — MORPHINE SULFATE 10 MG/ML
1 INJECTION, SOLUTION INTRAMUSCULAR; INTRAVENOUS EVERY 5 MIN PRN
Status: DISCONTINUED | OUTPATIENT
Start: 2020-08-17 | End: 2020-08-17 | Stop reason: HOSPADM

## 2020-08-17 RX ORDER — MAGNESIUM HYDROXIDE 1200 MG/15ML
LIQUID ORAL CONTINUOUS PRN
Status: COMPLETED | OUTPATIENT
Start: 2020-08-17 | End: 2020-08-17

## 2020-08-17 RX ORDER — FENTANYL CITRATE 50 UG/ML
25 INJECTION, SOLUTION INTRAMUSCULAR; INTRAVENOUS PRN
Status: DISCONTINUED | OUTPATIENT
Start: 2020-08-17 | End: 2020-08-17 | Stop reason: HOSPADM

## 2020-08-17 RX ADMIN — Medication 0.5 MG: at 10:16

## 2020-08-17 RX ADMIN — KETOROLAC TROMETHAMINE 30 MG: 30 INJECTION, SOLUTION INTRAMUSCULAR at 10:41

## 2020-08-17 RX ADMIN — APREPITANT 40 MG: 40 CAPSULE ORAL at 09:18

## 2020-08-17 RX ADMIN — ONDANSETRON HYDROCHLORIDE 4 MG: 2 INJECTION, SOLUTION INTRAMUSCULAR; INTRAVENOUS at 10:55

## 2020-08-17 RX ADMIN — PROPOFOL 200 MG: 10 INJECTION, EMULSION INTRAVENOUS at 09:51

## 2020-08-17 RX ADMIN — SODIUM CHLORIDE, SODIUM LACTATE, POTASSIUM CHLORIDE, AND CALCIUM CHLORIDE: .6; .31; .03; .02 INJECTION, SOLUTION INTRAVENOUS at 08:57

## 2020-08-17 RX ADMIN — SUCCINYLCHOLINE CHLORIDE 160 MG: 20 INJECTION, SOLUTION INTRAMUSCULAR; INTRAVENOUS at 09:51

## 2020-08-17 RX ADMIN — Medication 0.5 MG: at 10:31

## 2020-08-17 RX ADMIN — FENTANYL CITRATE 25 MCG: 50 INJECTION INTRAMUSCULAR; INTRAVENOUS at 11:05

## 2020-08-17 RX ADMIN — Medication 3 G: at 09:40

## 2020-08-17 RX ADMIN — FAMOTIDINE 20 MG: 10 INJECTION, SOLUTION INTRAVENOUS at 08:58

## 2020-08-17 RX ADMIN — OXYCODONE HYDROCHLORIDE AND ACETAMINOPHEN 2 TABLET: 5; 325 TABLET ORAL at 11:26

## 2020-08-17 RX ADMIN — FENTANYL CITRATE 100 MCG: 50 INJECTION INTRAMUSCULAR; INTRAVENOUS at 09:50

## 2020-08-17 RX ADMIN — FENTANYL CITRATE 25 MCG: 50 INJECTION INTRAMUSCULAR; INTRAVENOUS at 10:50

## 2020-08-17 RX ADMIN — Medication 0.5 MG: at 10:22

## 2020-08-17 ASSESSMENT — PAIN DESCRIPTION - PAIN TYPE
TYPE: SURGICAL PAIN

## 2020-08-17 ASSESSMENT — PULMONARY FUNCTION TESTS
PIF_VALUE: 4
PIF_VALUE: 8
PIF_VALUE: 6
PIF_VALUE: 27
PIF_VALUE: 2
PIF_VALUE: 3
PIF_VALUE: 28
PIF_VALUE: 27
PIF_VALUE: 27
PIF_VALUE: 11
PIF_VALUE: 27
PIF_VALUE: 5
PIF_VALUE: 27
PIF_VALUE: 5
PIF_VALUE: 27
PIF_VALUE: 3
PIF_VALUE: 27
PIF_VALUE: 11
PIF_VALUE: 27
PIF_VALUE: 28

## 2020-08-17 ASSESSMENT — PAIN DESCRIPTION - LOCATION: LOCATION: KNEE

## 2020-08-17 ASSESSMENT — PAIN - FUNCTIONAL ASSESSMENT: PAIN_FUNCTIONAL_ASSESSMENT: 0-10

## 2020-08-17 ASSESSMENT — PAIN SCALES - GENERAL
PAINLEVEL_OUTOF10: 10
PAINLEVEL_OUTOF10: 7
PAINLEVEL_OUTOF10: 9
PAINLEVEL_OUTOF10: 3
PAINLEVEL_OUTOF10: 7
PAINLEVEL_OUTOF10: 10
PAINLEVEL_OUTOF10: 10

## 2020-08-17 ASSESSMENT — PAIN DESCRIPTION - ORIENTATION: ORIENTATION: LEFT

## 2020-08-17 ASSESSMENT — PAIN DESCRIPTION - PROGRESSION: CLINICAL_PROGRESSION: NOT CHANGED

## 2020-08-17 ASSESSMENT — PAIN DESCRIPTION - DESCRIPTORS: DESCRIPTORS: STABBING

## 2020-08-17 ASSESSMENT — ENCOUNTER SYMPTOMS: SHORTNESS OF BREATH: 0

## 2020-08-17 ASSESSMENT — PAIN DESCRIPTION - FREQUENCY: FREQUENCY: INTERMITTENT

## 2020-08-17 NOTE — OP NOTE
[address]                 [address] [place] [address]                                OPERATIVE REPORT    PATIENT NAME: Helga Solomon                     :        1970  MED REC NO:   [id number]                          ROOM:  ACCOUNT NO:   [de-identified]                           ADMIT DATE: 2020  PROVIDER:     Silas López MD    DATE OF PROCEDURE:  2020    PREOPERATIVE DIAGNOSIS:  Left knee chondromalacia of patella. POSTOPERATIVE DIAGNOSIS:  Left knee chondromalacia of patella. OPERATION PERFORMED:  Left knee video arthroscopy with chondroplasty of  the patellofemoral joint. SURGEON:  Silas López MD    ANESTHESIA:  General.    BLOOD LOSS:  Less than 5 mL. COMPLICATIONS:  None noted. INDICATIONS FOR OPERATION:  This is a 18-year-old female, who has had a  history persistent worsening left knee pain consistent with  chondromalacia of patella and after failure of all other modalities,  elected for further recommendation of surgical intervention. DESCRIPTION OF OPERATION:  The patient was taken to the operating room  where a general anesthetic was obtained and antibiotics were given IV  piggyback preoperatively. The left knee and leg were sterilely prepped  and draped and a two-port arthroscopy of the left knee was carried out  in the usual fashion using a 30-degree arthroscope. Upon entry into the  knee, she was noted to have grade 3 chondromalacia of patella with  reciprocal changes in the trochlear groove and chondroplasty of affected  surfaces was performed to satisfaction with ArthroCare wand. She had  some spurs of the medial femoral condyle and these were shaved with a  chondrotome. Otherwise, medial meniscus, lateral meniscus, ACL, and PCL  were intact and pristine. The knee was then thoroughly irrigated and  evacuated of all loose debris and instilled with 20 mL of 0.25% Marcaine  plain.   The port was repaired with 4-0 nylon in a figure-of-eight  fashion. The knee was dressed with Steri-Strips, dressings, sponge,  ABDs, Webril, and Ace wrap. She appeared to have tolerated the  procedure well and will be taken to the recovery room stable. Less than  5 mL of blood loss, and no noted complications.       Martha De Santiago MD    D: 08/17/2020 10:22:26       T: 08/17/2020 13:15:53     [email or web address]  Job#: [id number]     Doc#: [id number]  CC:

## 2020-08-17 NOTE — BRIEF OP NOTE
Brief Postoperative Note      Patient: Parisa Chu  YOB: 1970  MRN: [id number]    Date of Procedure: 8/17/2020    Pre-Op Diagnosis: CHONDROMALACIA PATELLA LEFT KNEE    Post-Op Diagnosis: Same       Procedure(s):  LEFT KNEE VIDEO ARTHROSCOPY, CHONDROPLASTY OF PATELLA    Surgeon(s):  Aidan Infante MD    Assistant:  Surgical Assistant: Katerine Colvin    Anesthesia: General    Estimated Blood Loss (mL): Minimal    Complications: None    Specimens:   * No specimens in log *    Implants:  * No implants in log *      Drains: * No LDAs found *    Findings: ignacio    Electronically signed by Aidan Infante MD on 8/17/2020 at 10:05 AM

## 2020-08-17 NOTE — ANESTHESIA POSTPROCEDURE EVALUATION
Department of Anesthesiology  Postprocedure Note    Patient: Padmini Mccartney  MRN: [id number]  YOB: 1970  Date of evaluation: 8/17/2020  Time:  12:42 PM     Procedure Summary     Date:  08/17/20 Room / Location:  [place] [address] / [facility]    Anesthesia Start:  6918 Anesthesia Stop:  1016    Procedure:  LEFT KNEE VIDEO ARTHROSCOPY, CHONDROPLASTY OF PATELLA (Left Knee) Diagnosis:  (CHONDROMALACIA PATELLA LEFT KNEE)    Surgeon:  Deana Garland MD Responsible Provider:  Mike Sabillon MD    Anesthesia Type:  general ASA Status:  3          Anesthesia Type: general    Magnolia Phase I: Magnolia Score: 10    Magnolia Phase II: Magnolia Score: 10    Last vitals: Reviewed and per EMR flowsheets.      Vitals:    08/17/20 1110 08/17/20 1115 08/17/20 1117 08/17/20 1129   BP: 108/67  108/67 104/69   Pulse: 61  64 71   Resp: 10  15 18   Temp:  97.4 °F (36.3 °C) 97.4 °F (36.3 °C)    TempSrc:       SpO2: 100%  99% 96%   Weight:       Height:         Anesthesia Post Evaluation    Patient location during evaluation: bedside  Patient participation: complete - patient participated  Level of consciousness: awake and alert  Airway patency: patent  Complications: no  Cardiovascular status: hemodynamically stable  Respiratory status: acceptable  Hydration status: euvolemic

## 2020-08-24 ENCOUNTER — OFFICE VISIT (OUTPATIENT)
Dept: ORTHOPEDIC SURGERY | Age: 50
End: 2020-08-24
Payer: COMMERCIAL

## 2020-08-24 VITALS — HEIGHT: 65 IN | WEIGHT: 293 LBS | BODY MASS INDEX: 48.82 KG/M2

## 2020-08-24 PROCEDURE — 3017F COLORECTAL CA SCREEN DOC REV: CPT | Performed by: ORTHOPAEDIC SURGERY

## 2020-08-24 PROCEDURE — 1036F TOBACCO NON-USER: CPT | Performed by: ORTHOPAEDIC SURGERY

## 2020-08-24 PROCEDURE — G8417 CALC BMI ABV UP PARAM F/U: HCPCS | Performed by: ORTHOPAEDIC SURGERY

## 2020-08-24 PROCEDURE — 99024 POSTOP FOLLOW-UP VISIT: CPT | Performed by: ORTHOPAEDIC SURGERY

## 2020-08-24 PROCEDURE — 99212 OFFICE O/P EST SF 10 MIN: CPT | Performed by: ORTHOPAEDIC SURGERY

## 2020-08-24 PROCEDURE — G8427 DOCREV CUR MEDS BY ELIG CLIN: HCPCS | Performed by: ORTHOPAEDIC SURGERY

## 2020-08-26 ENCOUNTER — TELEPHONE (OUTPATIENT)
Dept: ORTHOPEDIC SURGERY | Age: 50
End: 2020-08-26

## 2020-08-26 NOTE — TELEPHONE ENCOUNTER
SPOKE WITH PATIENT IN REGARDS TO MRI LUMBAR SPINE. NO AUTHORIZATION REQUIRED. INFORMED PATIENT THE MRI ORDER WAS FAXED TO [facility]. SHE WILL CALL OUR OFFICE AFTER TESTING TO SEE IF SHE NEEDS REFERRED TO SPINE.

## 2020-10-15 ENCOUNTER — HOSPITAL ENCOUNTER (OUTPATIENT)
Dept: MAMMOGRAPHY | Age: 50
Discharge: HOME | End: 2020-10-20
Payer: COMMERCIAL

## 2020-10-15 PROCEDURE — 77063 BREAST TOMOSYNTHESIS BI: CPT

## 2020-10-19 ENCOUNTER — HOSPITAL ENCOUNTER (OUTPATIENT)
Age: 50
Discharge: HOME | End: 2020-10-19
Payer: COMMERCIAL

## 2020-10-19 PROCEDURE — 36415 COLL VENOUS BLD VENIPUNCTURE: CPT

## 2020-10-19 PROCEDURE — 84460 ALANINE AMINO (ALT) (SGPT): CPT

## 2020-10-19 PROCEDURE — 84450 TRANSFERASE (AST) (SGOT): CPT

## 2020-10-20 LAB
ALT SERPL-CCNC: 18 U/L (ref 10–40)
AST SERPL-CCNC: 16 U/L (ref 15–37)

## 2021-10-14 ENCOUNTER — OFFICE VISIT (OUTPATIENT)
Dept: ORTHOPEDIC SURGERY | Age: 51
End: 2021-10-14
Payer: COMMERCIAL

## 2021-10-14 VITALS — HEIGHT: 65 IN | WEIGHT: 293 LBS | BODY MASS INDEX: 48.82 KG/M2

## 2021-10-14 DIAGNOSIS — M70.62 GREATER TROCHANTERIC BURSITIS OF LEFT HIP: ICD-10-CM

## 2021-10-14 DIAGNOSIS — M25.552 LEFT HIP PAIN: ICD-10-CM

## 2021-10-14 DIAGNOSIS — M25.561 ACUTE PAIN OF RIGHT KNEE: ICD-10-CM

## 2021-10-14 DIAGNOSIS — M17.11 PRIMARY OSTEOARTHRITIS OF RIGHT KNEE: Primary | ICD-10-CM

## 2021-10-14 PROCEDURE — G8484 FLU IMMUNIZE NO ADMIN: HCPCS | Performed by: ORTHOPAEDIC SURGERY

## 2021-10-14 PROCEDURE — 20610 DRAIN/INJ JOINT/BURSA W/O US: CPT | Performed by: ORTHOPAEDIC SURGERY

## 2021-10-14 PROCEDURE — G8427 DOCREV CUR MEDS BY ELIG CLIN: HCPCS | Performed by: ORTHOPAEDIC SURGERY

## 2021-10-14 PROCEDURE — 3017F COLORECTAL CA SCREEN DOC REV: CPT | Performed by: ORTHOPAEDIC SURGERY

## 2021-10-14 PROCEDURE — 1036F TOBACCO NON-USER: CPT | Performed by: ORTHOPAEDIC SURGERY

## 2021-10-14 PROCEDURE — 99213 OFFICE O/P EST LOW 20 MIN: CPT | Performed by: ORTHOPAEDIC SURGERY

## 2021-10-14 PROCEDURE — G8417 CALC BMI ABV UP PARAM F/U: HCPCS | Performed by: ORTHOPAEDIC SURGERY

## 2021-10-14 RX ORDER — METHYLPREDNISOLONE ACETATE 40 MG/ML
40 INJECTION, SUSPENSION INTRA-ARTICULAR; INTRALESIONAL; INTRAMUSCULAR; SOFT TISSUE ONCE
Status: COMPLETED | OUTPATIENT
Start: 2021-10-14 | End: 2021-10-14

## 2021-10-14 RX ORDER — BUPIVACAINE HYDROCHLORIDE 2.5 MG/ML
12 INJECTION, SOLUTION INFILTRATION; PERINEURAL ONCE
Status: COMPLETED | OUTPATIENT
Start: 2021-10-14 | End: 2021-10-14

## 2021-10-14 RX ORDER — MELOXICAM 15 MG/1
15 TABLET ORAL DAILY
Qty: 30 TABLET | Refills: 3 | Status: SHIPPED | OUTPATIENT
Start: 2021-10-14

## 2021-10-14 RX ADMIN — BUPIVACAINE HYDROCHLORIDE 30 MG: 2.5 INJECTION, SOLUTION INFILTRATION; PERINEURAL at 11:06

## 2021-10-14 RX ADMIN — BUPIVACAINE HYDROCHLORIDE 30 MG: 2.5 INJECTION, SOLUTION INFILTRATION; PERINEURAL at 11:05

## 2021-10-14 RX ADMIN — METHYLPREDNISOLONE ACETATE 40 MG: 40 INJECTION, SUSPENSION INTRA-ARTICULAR; INTRALESIONAL; INTRAMUSCULAR; SOFT TISSUE at 11:06

## 2021-10-14 NOTE — PROGRESS NOTES
KNEE VISIT      HISTORY OF PRESENT ILLNESS    Joshua Holly is a 45 y.o. female who presents for evaluation of right knee and left hip pain. She appears somewhat injections into her left knee and had arthroscopy for chondroplasty of the patella with good result. She has been having increasing pain in the right knee now with difficulty getting up and down steps and also does complain some lateral hip pain that is difficult with walking particularly long standing and grades and laying on her side on the left. She denies any history of trauma. She grades her pain 4-6 over 10. ROS    Well-documented patient history form dated 10/14/2021  All other ROS negative except for above.     Past Surgical history    Past Surgical History:   Procedure Laterality Date    APPENDECTOMY      BREAST SURGERY      bilateral lumpectomy    CARPAL TUNNEL RELEASE      bilateral    CHOLECYSTECTOMY      HAND SURGERY Left     HAND    HYSTEROSCOPY      KNEE ARTHROSCOPY Left 8/17/2020    LEFT KNEE VIDEO ARTHROSCOPY, CHONDROPLASTY OF PATELLA performed by Osvaldo Tucker MD at [address] Left     MENISCUS    NASAL SEPTUM SURGERY  08/2018    OTHER SURGICAL HISTORY Left 01/26/2018    left knee DVA with LBR and abrasion arthroplasty    OTHER SURGICAL HISTORY      LEFT KNEE VIDEO ARTHROSCOPY, CHONDROPLASTY OF PATELLA (Left Knee)        PAST MEDICAL    Past Medical History:   Diagnosis Date    Back pain     Greater trochanteric bursitis of left hip 10/14/2021    Kienbock disease, adult     Kienbock disease, adult 2002    Kienbock disease, adult 2007    Kienbock disease, adult 2007    Localized osteoarthritis of left knee 1/24/2018    Other synovitis and tenosynovitis, unspecified ankle and foot 8/8/2017    Reflux     Sinusitis     Sleep apnea     NOT USING       Allergies    Allergies   Allergen Reactions    No Known Allergies        Meds    Current Outpatient Medications   Medication Sig Dispense Refill  meloxicam (MOBIC) 15 MG tablet Take 1 tablet by mouth daily 30 tablet 3    cyclobenzaprine (FLEXERIL) 5 MG tablet Take 5 mg by mouth 3 times daily as needed for Muscle spasms      Naproxen Sodium (ALEVE) 220 MG CAPS Take 1 tablet by mouth      KLOR-CON 10 10 MEQ extended release tablet TAKE 1 TABLET BY MOUTH 2 (TWO) TIMES DAILY., ON DAYS TAKING LASIX  3    furosemide (LASIX) 20 MG tablet daily as needed       sulindac (CLINORIL) 200 MG tablet Take 1 tablet by mouth 2 times daily 60 tablet 3     Current Facility-Administered Medications   Medication Dose Route Frequency Provider Last Rate Last Admin    methylPREDNISolone acetate (DEPO-MEDROL) injection 40 mg  40 mg IntraMUSCular Once Elba Grajeda MD           Social    Social History     Socioeconomic History    Marital status:      Spouse name: Not on file    Number of children: Not on file    Years of education: Not on file    Highest education level: Not on file   Occupational History    Not on file   Tobacco Use    Smoking status: Never Smoker    Smokeless tobacco: Never Used   Substance and Sexual Activity    Alcohol use: No    Drug use: No    Sexual activity: Yes     Partners: Male   Other Topics Concern    Not on file   Social History Narrative    Not on file     Social Determinants of Health     Financial Resource Strain:     Difficulty of Paying Living Expenses:    Food Insecurity:     Worried About Running Out of Food in the Last Year:     Ran Out of Food in the Last Year:    Transportation Needs:     Lack of Transportation (Medical):      Lack of Transportation (Non-Medical):    Physical Activity:     Days of Exercise per Week:     Minutes of Exercise per Session:    Stress:     Feeling of Stress :    Social Connections:     Frequency of Communication with Friends and Family:     Frequency of Social Gatherings with Friends and Family:     Attends Baptism Services:     Active Member of Clubs or Organizations:  Attends Club or Organization Meetings:     Marital Status:    Intimate Partner Violence:     Fear of Current or Ex-Partner:     Emotionally Abused:     Physically Abused:     Sexually Abused:        Family HISTORY    Family History   Problem Relation Age of Onset    Cancer Mother         BREAST    Cancer Maternal Aunt         COLON    Heart Disease Maternal Aunt        PHYSICAL EXAM    Vital Signs:  Ht 5' 5\" (1.651 m)   Wt (!) 330 lb (149.7 kg)   BMI 54.91 kg/m²   General Appearance: Obese body habitus with a BMI of 54.91. Alert and oriented to person, place, and time. Affect:  Normal.   Gait: Moderately antalgic. Good balance and coordination. Skin:  Intact. Sensation:  Intact. Strength:  Intact. Reflexes:  Intact. Pulses:  Intact. Knee Exam:    Effusion: Negative    Range of Motion Right Left   Extension 0 0   Flexion 105 110     Provocative Test Right Left    Positive Negative Positive Negative   Anterior drawer [] [] [] []   Lachman [] [] [] []   Posterior drawer [] [] [] []   Varus testing [] [x] [] [x]   Valgus testing [x] [] [] [x]   Joint line tenderness [x] [] [] [x]     Additional Exam Comments: Her neurocirculatory lymphatic exam is normal symmetric both lower extremities. She does have tenderness over left greater trochanter with no sciatic notch pain negative straight leg raise negative Lasegue's. On exam to her right knee she has pain medial compartment stress as well as range of motion with direct palpation anteromedially. IMAGING STUDIES    X-rays 3 views of the right knee demonstrate severe end-stage osteoarthritis mostly medial compartment of the right knee    X-rays 2 views of the left hip reveal minimal degenerative change. IMPRESSION    Right knee pain secondary to osteoarthritis  Left hip pain secondary to greater trochanteric bursitis    PLAN      1.   Conservative care options including physical therapy, NSAIDs, bracing, and activity modification were discussed. 2.  The indications for therapeutic injections were discussed. 3.  The indications for additional imaging studies were discussed. 4.  After considering the various options discussed, the patient elected to pursue a course that includes right knee and left hip injections. Recommendation is for a cortisone injection into the right knee. After informed consent was received from the patient, the right knee was injected with 1 mL(40mg)Depo-Medrol and 4 mL of 0.25% Marcaine  in the syringe from an anterolateral joint line approach, using a 25-gauge needle, under sterile Betadine prep, using ethyl chloride as a topical refrigerant, for a diagnosis of osteoarthritis. The patient appeared to tolerate it well. The patient should return here periodically as needed. Encounter Diagnoses   Name Primary?     Left hip pain     Acute pain of right knee     Primary osteoarthritis of right knee Yes    Greater trochanteric bursitis of left hip         Orders Placed This Encounter   Procedures    XR KNEE RIGHT (3 VIEWS)     Standing Status:   Future     Number of Occurrences:   1     Standing Expiration Date:   10/20/2021     Order Specific Question:   Reason for exam:     Answer:   pain    XR HIP LEFT (2-3 VIEWS)     Standing Status:   Future     Number of Occurrences:   1     Standing Expiration Date:   10/14/2022     Order Specific Question:   Reason for exam:     Answer:   PAIN    AR ARTHROCENTESIS ASPIR&/INJ MAJOR JT/BURSA W/O US    AR ARTHROCENTESIS ASPIR&/INJ MAJOR JT/BURSA W/O US        After informed consent was received from the patient, the left hip was sterilely prepped using Betadine with the patient in a lateral decubitusposition then using ethyl chloride as a topical refrigerant andan injection using 1 mL of 40mg/ml Depo-Medrol 2 mL each of 1% Xylocaine and 0.5%Marcaine in a 5 mL syringe and a 25-gauge spinal needle was used to inject the medication into the greater trochanteric region and to the point of maximum  tenderness. The patient otherwise appeared to tolerate the injection well.

## 2021-10-21 ENCOUNTER — HOSPITAL ENCOUNTER (OUTPATIENT)
Dept: MAMMOGRAPHY | Age: 51
Discharge: HOME | End: 2021-10-26
Payer: COMMERCIAL

## 2021-10-21 VITALS — HEIGHT: 64 IN | WEIGHT: 293 LBS | BODY MASS INDEX: 50.02 KG/M2

## 2021-10-21 DIAGNOSIS — Z12.31 VISIT FOR SCREENING MAMMOGRAM: ICD-10-CM

## 2021-10-21 PROCEDURE — 77063 BREAST TOMOSYNTHESIS BI: CPT

## 2021-11-17 ENCOUNTER — APPOINTMENT (OUTPATIENT)
Dept: CT IMAGING | Age: 51
End: 2021-11-17
Payer: COMMERCIAL

## 2021-11-17 ENCOUNTER — HOSPITAL ENCOUNTER (EMERGENCY)
Age: 51
Discharge: HOME | End: 2021-11-18
Payer: COMMERCIAL

## 2021-11-17 DIAGNOSIS — R60.0 LEG EDEMA: ICD-10-CM

## 2021-11-17 DIAGNOSIS — M79.89 LEG SWELLING: Primary | ICD-10-CM

## 2021-11-17 DIAGNOSIS — R09.89 SUSPECTED DVT (DEEP VEIN THROMBOSIS): ICD-10-CM

## 2021-11-17 LAB
A/G RATIO: 0.7 (ref 1.1–2.2)
ALBUMIN SERPL-MCNC: 3 G/DL (ref 3.4–5)
ALP BLD-CCNC: 108 U/L (ref 40–129)
ALT SERPL-CCNC: 25 U/L (ref 10–40)
ANION GAP SERPL CALCULATED.3IONS-SCNC: 12 MMOL/L (ref 3–16)
AST SERPL-CCNC: 40 U/L (ref 15–37)
BACTERIA: ABNORMAL /HPF
BASOPHILS ABSOLUTE: 0 K/UL (ref 0–0.2)
BASOPHILS RELATIVE PERCENT: 0.2 %
BILIRUB SERPL-MCNC: 0.5 MG/DL (ref 0–1)
BILIRUBIN URINE: ABNORMAL
BLOOD, URINE: ABNORMAL
BUN BLDV-MCNC: 13 MG/DL (ref 7–20)
CALCIUM SERPL-MCNC: 9.1 MG/DL (ref 8.3–10.6)
CHLORIDE BLD-SCNC: 99 MMOL/L (ref 99–110)
CLARITY: ABNORMAL
CO2: 23 MMOL/L (ref 21–32)
COLOR: ABNORMAL
CREAT SERPL-MCNC: 0.7 MG/DL (ref 0.6–1.1)
D DIMER: 928 NG/ML DDU (ref 0–229)
EOSINOPHILS ABSOLUTE: 0 K/UL (ref 0–0.6)
EOSINOPHILS RELATIVE PERCENT: 0.2 %
EPITHELIAL CELLS, UA: ABNORMAL /HPF (ref 0–5)
GFR AFRICAN AMERICAN: >60
GFR NON-AFRICAN AMERICAN: >60
GLUCOSE BLD-MCNC: 90 MG/DL (ref 70–99)
GLUCOSE URINE: NEGATIVE MG/DL
HCT VFR BLD CALC: 40.6 % (ref 36–48)
HEMOGLOBIN: 13.8 G/DL (ref 12–16)
KETONES, URINE: 15 MG/DL
LEUKOCYTE ESTERASE, URINE: ABNORMAL
LYMPHOCYTES ABSOLUTE: 0.7 K/UL (ref 1–5.1)
LYMPHOCYTES RELATIVE PERCENT: 7.3 %
MCH RBC QN AUTO: 28.9 PG (ref 26–34)
MCHC RBC AUTO-ENTMCNC: 34.1 G/DL (ref 31–36)
MCV RBC AUTO: 84.9 FL (ref 80–100)
MICROSCOPIC EXAMINATION: YES
MONOCYTES ABSOLUTE: 0.6 K/UL (ref 0–1.3)
MONOCYTES RELATIVE PERCENT: 6.2 %
NEUTROPHILS ABSOLUTE: 8.1 K/UL (ref 1.7–7.7)
NEUTROPHILS RELATIVE PERCENT: 86.1 %
NITRITE, URINE: NEGATIVE
PDW BLD-RTO: 14.3 % (ref 12.4–15.4)
PH UA: 6 (ref 5–8)
PLATELET # BLD: 232 K/UL (ref 135–450)
PMV BLD AUTO: 8.1 FL (ref 5–10.5)
POTASSIUM SERPL-SCNC: 5 MMOL/L (ref 3.5–5.1)
PROTEIN UA: 30 MG/DL
RBC # BLD: 4.79 M/UL (ref 4–5.2)
RBC UA: ABNORMAL /HPF (ref 0–4)
SODIUM BLD-SCNC: 134 MMOL/L (ref 136–145)
SPECIFIC GRAVITY UA: >=1.03 (ref 1–1.03)
TOTAL PROTEIN: 7.5 G/DL (ref 6.4–8.2)
TROPONIN: <0.01 NG/ML
URINE TYPE: ABNORMAL
UROBILINOGEN, URINE: 1 E.U./DL
WBC # BLD: 9.4 K/UL (ref 4–11)
WBC UA: ABNORMAL /HPF (ref 0–5)

## 2021-11-17 PROCEDURE — 6360000002 HC RX W HCPCS: Performed by: NURSE PRACTITIONER

## 2021-11-17 PROCEDURE — 84484 ASSAY OF TROPONIN QUANT: CPT

## 2021-11-17 PROCEDURE — 87086 URINE CULTURE/COLONY COUNT: CPT

## 2021-11-17 PROCEDURE — 85379 FIBRIN DEGRADATION QUANT: CPT

## 2021-11-17 PROCEDURE — 80053 COMPREHEN METABOLIC PANEL: CPT

## 2021-11-17 PROCEDURE — 85025 COMPLETE CBC W/AUTO DIFF WBC: CPT

## 2021-11-17 PROCEDURE — 96374 THER/PROPH/DIAG INJ IV PUSH: CPT

## 2021-11-17 PROCEDURE — 81001 URINALYSIS AUTO W/SCOPE: CPT

## 2021-11-17 PROCEDURE — 6360000004 HC RX CONTRAST MEDICATION: Performed by: NURSE PRACTITIONER

## 2021-11-17 PROCEDURE — 96375 TX/PRO/DX INJ NEW DRUG ADDON: CPT

## 2021-11-17 PROCEDURE — 71260 CT THORAX DX C+: CPT

## 2021-11-17 PROCEDURE — 99284 EMERGENCY DEPT VISIT MOD MDM: CPT

## 2021-11-17 RX ORDER — MORPHINE SULFATE 4 MG/ML
4 INJECTION, SOLUTION INTRAMUSCULAR; INTRAVENOUS
Status: DISCONTINUED | OUTPATIENT
Start: 2021-11-17 | End: 2021-11-18 | Stop reason: HOSPADM

## 2021-11-17 RX ORDER — ONDANSETRON 2 MG/ML
4 INJECTION INTRAMUSCULAR; INTRAVENOUS ONCE
Status: COMPLETED | OUTPATIENT
Start: 2021-11-17 | End: 2021-11-17

## 2021-11-17 RX ADMIN — MORPHINE SULFATE 4 MG: 4 INJECTION INTRAVENOUS at 23:05

## 2021-11-17 RX ADMIN — IOPAMIDOL 75 ML: 755 INJECTION, SOLUTION INTRAVENOUS at 22:56

## 2021-11-17 RX ADMIN — ONDANSETRON 4 MG: 2 INJECTION INTRAMUSCULAR; INTRAVENOUS at 23:05

## 2021-11-17 ASSESSMENT — PAIN SCALES - GENERAL: PAINLEVEL_OUTOF10: 7

## 2021-11-17 NOTE — Clinical Note
Rubi Kilgore was seen and treated in our emergency department on 11/17/2021. She may return to work on 11/21/2021. Start blood thinners tonight, get your ultrasound done and if you have a blood clot you need to follow-up with your PCP ASAP for continued Eliquis prescription. If the ultrasound is negative, start taking the antibiotic that I provided you. Urine culture was sent and they will contact you based on what that culture says. If you have any questions or concerns, please don't hesitate to call.       Keyanna Lopez, APRN - CNP

## 2021-11-18 ENCOUNTER — HOSPITAL ENCOUNTER (OUTPATIENT)
Dept: VASCULAR LAB | Age: 51
Discharge: HOME | End: 2021-11-18
Payer: COMMERCIAL

## 2021-11-18 VITALS
DIASTOLIC BLOOD PRESSURE: 67 MMHG | BODY MASS INDEX: 48.82 KG/M2 | HEIGHT: 65 IN | WEIGHT: 293 LBS | TEMPERATURE: 98.6 F | RESPIRATION RATE: 21 BRPM | OXYGEN SATURATION: 97 % | SYSTOLIC BLOOD PRESSURE: 117 MMHG | HEART RATE: 89 BPM

## 2021-11-18 DIAGNOSIS — M79.89 LEG SWELLING: ICD-10-CM

## 2021-11-18 DIAGNOSIS — R60.0 LEG EDEMA: ICD-10-CM

## 2021-11-18 DIAGNOSIS — R09.89 SUSPECTED DVT (DEEP VEIN THROMBOSIS): ICD-10-CM

## 2021-11-18 PROCEDURE — 6370000000 HC RX 637 (ALT 250 FOR IP): Performed by: NURSE PRACTITIONER

## 2021-11-18 PROCEDURE — 93971 EXTREMITY STUDY: CPT

## 2021-11-18 RX ORDER — OXYCODONE HYDROCHLORIDE AND ACETAMINOPHEN 5; 325 MG/1; MG/1
1 TABLET ORAL EVERY 6 HOURS PRN
Qty: 10 TABLET | Refills: 0 | Status: SHIPPED | OUTPATIENT
Start: 2021-11-18 | End: 2021-11-21

## 2021-11-18 RX ORDER — ONDANSETRON 4 MG/1
4 TABLET, ORALLY DISINTEGRATING ORAL EVERY 8 HOURS PRN
Qty: 20 TABLET | Refills: 0 | Status: SHIPPED | OUTPATIENT
Start: 2021-11-18

## 2021-11-18 RX ORDER — DOXYCYCLINE HYCLATE 100 MG
100 TABLET ORAL 2 TIMES DAILY
Qty: 14 TABLET | Refills: 0 | Status: SHIPPED | OUTPATIENT
Start: 2021-11-18 | End: 2021-11-25

## 2021-11-18 RX ADMIN — APIXABAN 10 MG: 5 TABLET, FILM COATED ORAL at 01:06

## 2021-11-18 ASSESSMENT — PAIN SCALES - GENERAL: PAINLEVEL_OUTOF10: 4

## 2021-11-18 NOTE — ED NOTES
Patient identified as a positive fall risk on the ED triage fall screening. Patient placed in fall precautions which includes:  yellow fall risk bracelet on wrist and yellow socks on feet. Patient instructed on importance of not getting out of bed or ambulating without assistance for safety. Pt verbalized understanding.        Lexie Garland RN  11/17/21 3017

## 2021-11-18 NOTE — ED NOTES
Report received care assumed provided water and crackers for a PO challenge      Carl Nunez RN  11/18/21 6783

## 2021-11-18 NOTE — ED NOTES
Patient to ED from home for increased pain in L leg accompanied by leg swelling, nausea, vomiting and dizziness. Patient states that she went to urgent care for issue and she was tested for covid and given an IM shot of zofran and sent on her way. Patient states that she has been unable to get the pain to go away and is unable to sleep due to pain as well. Patient respirations are even and unlabored, skin warm and dry. Patient appears to be in pain on assessment. Patient updated on plan of care and was agreeable.      Abhijeet Owen RN  11/17/21 2001

## 2021-11-18 NOTE — ED NOTES
Tolerated PO fluid with no difficulty. Medicated per order. Provided Eliquis starter dose pack with education and instructions on how to take medication. Patient verbalizes understanding. Aware to have ultrasound tomorrow. Will take Eliqus as directed and follow up if positive. If negative will take ATB and stop eliquis. To lobby via wheelchair.       Della Boston RN  11/18/21 6130

## 2021-11-18 NOTE — ED NOTES
Bed: 28  Expected date:   Expected time:   Means of arrival:   Comments:  shamika Ov[place], [facility]  11/17/21 1941

## 2021-11-19 LAB — URINE CULTURE, ROUTINE: NORMAL

## 2021-11-21 NOTE — ED PROVIDER NOTES
[address] [place] [address]  ED  EMERGENCY DEPARTMENT ENCOUNTER      This patient was not seen and evaluated by the attending physician. Pt Name: Kristopher Forbes  MRN: [id number]  Jennigfdeena 1970  Date of evaluation: 11/17/2021  Provider: SABRINA Mccallum  PCP: Naren Tony PA-C      History provided by the patient. CHIEFCOMPLAINT:     Chief Complaint   Patient presents with    Leg Swelling       HISTORY OF PRESENT ILLNESS:      Kristopher Forbes is a 45 y.o. female who presents to [address] [place] [address]  ED with complaints of left lower leg pain and swelling. Patient states that she has had left leg pain and swelling for last day or so, there is erythema noted to the distal left lower extremity as well as some tenderness to the left proximal leg. Patient has no other extremity complaints she had no chest pain or difficulty breathing but states that she had had some vomiting and dizziness. She is here for further evaluation. LOCATION:left leg  QUALITY:ache  SEVERITY:7  DURATION:a few days  MODIFYING FACTORS:none noted    Nursing Notes were reviewed     REVIEW OF SYSTEMS:     Review of Systems  All systems, a total of 10, are reviewed and negative except for those that were just noted in history present illness.         PAST MEDICAL HISTORY:     Past Medical History:   Diagnosis Date    Back pain     Greater trochanteric bursitis of left hip 10/14/2021    Kienbock disease, adult     Kienbock disease, adult 2002    Kienbock disease, adult 2007    Kienbock disease, adult 2007    Localized osteoarthritis of left knee 1/24/2018    Other synovitis and tenosynovitis, unspecified ankle and foot 8/8/2017    Reflux     Sinusitis     Sleep apnea     NOT USING         SURGICAL HISTORY:      Past Surgical History:   Procedure Laterality Date    APPENDECTOMY      BREAST BIOPSY      multiple bilateral biopsies    BREAST SURGERY      bilateral lumpectomy    CARPAL TUNNEL RELEASE bilateral    CHOLECYSTECTOMY      HAND SURGERY Left     HAND    HYSTEROSCOPY      KNEE ARTHROSCOPY Left 8/17/2020    LEFT KNEE VIDEO ARTHROSCOPY, CHONDROPLASTY OF PATELLA performed by Elba Grajeda MD at [facility], [place] Left     MENISCUS    NASAL SEPTUM SURGERY  08/2018    OTHER SURGICAL HISTORY Left 01/26/2018    left knee DVA with LBR and abrasion arthroplasty    OTHER SURGICAL HISTORY      LEFT KNEE VIDEO ARTHROSCOPY, CHONDROPLASTY OF PATELLA (Left Knee)          CURRENT MEDICATIONS:       Discharge Medication List as of 11/18/2021  1:04 AM      CONTINUE these medications which have NOT CHANGED    Details   meloxicam (MOBIC) 15 MG tablet Take 1 tablet by mouth daily, Disp-30 tablet, R-3Normal      cyclobenzaprine (FLEXERIL) 5 MG tablet Take 5 mg by mouth 3 times daily as needed for Muscle spasmsHistorical Med      sulindac (CLINORIL) 200 MG tablet Take 1 tablet by mouth 2 times daily, Disp-60 tablet, R-3Normal      Naproxen Sodium (ALEVE) 220 MG CAPS Take 1 tablet by mouthHistorical Med      KLOR-CON 10 10 MEQ extended release tablet TAKE 1 TABLET BY MOUTH 2 (TWO) TIMES DAILY., ON DAYS TAKING LASIX, R-3, DAWHistorical Med      furosemide (LASIX) 20 MG tablet daily as needed Historical Med               ALLERGIES:    No known allergies    FAMILY HISTORY:       Family History   Problem Relation Age of Onset    Cancer Mother         BREAST    Breast Cancer Mother     Cancer Maternal Aunt         COLON    Heart Disease Maternal Aunt     Breast Cancer Paternal Grandmother           SOCIAL HISTORY:     Social History     Socioeconomic History    Marital status:      Spouse name: None    Number of children: None    Years of education: None    Highest education level: None   Occupational History    None   Tobacco Use    Smoking status: Never Smoker    Smokeless tobacco: Never Used   Substance and Sexual Activity    Alcohol use: No    Drug use: No    Sexual activity: Yes Partners: Male   Other Topics Concern    None   Social History Narrative    None     Social Determinants of Health     Financial Resource Strain:     Difficulty of Paying Living Expenses: Not on file   Food Insecurity:     Worried About Running Out of Food in the Last Year: Not on file    Juan of Food in the Last Year: Not on file   Transportation Needs:     Lack of Transportation (Medical): Not on file    Lack of Transportation (Non-Medical): Not on file   Physical Activity:     Days of Exercise per Week: Not on file    Minutes of Exercise per Session: Not on file   Stress:     Feeling of Stress : Not on file   Social Connections:     Frequency of Communication with Friends and Family: Not on file    Frequency of Social Gatherings with Friends and Family: Not on file    Attends Confucianism Services: Not on file    Active Member of [address] [place] [address] [email or web address] or Organizations: Not on file    Attends Club or Organization Meetings: Not on file    Marital Status: Not on file   Intimate Partner Violence:     Fear of Current or Ex-Partner: Not on file    Emotionally Abused: Not on file    Physically Abused: Not on file    Sexually Abused: Not on file   Housing Stability:     Unable to Pay for Housing in the Last Year: Not on file    Number of [place] in the Last Year: Not on file    Unstable Housing in the Last Year: Not on file       SCREENINGS:    Stefania Coma Scale  Eye Opening: Spontaneous  Best Verbal Response: Oriented  Best Motor Response: Obeys commands  Stefania Coma Scale Score: 15        PHYSICAL EXAM:       ED Triage Vitals [11/17/21 1934]   BP Temp Temp Source Pulse Resp SpO2 Height Weight   (!) 147/93 98.6 °F (37 °C) Oral 92 16 97 % 5' 5\" (1.651 m) (!) 325 lb (147.4 kg)       Physical Exam    CONSTITUTIONAL: Awake and alert. Cooperative. Well-developed. Well-nourished.    Vitals:    11/17/21 2309 11/17/21 2337 11/18/21 0040 11/18/21 0110   BP: 134/73 (!) 142/67 131/66 117/67   Pulse: 88 91 96 89 Resp: 17 15 9 21   Temp:       TempSrc:       SpO2: 98% 94% 92% 97%   Weight:       Height:         HENT: Normocephalic. Atraumatic. External ears normal, without discharge. TMs clear bilaterally. Nonasal discharge. Oropharynx clear, no erythema. Mucous membranes moist.  EYES: Conjunctiva non-injected, nolid abnormalities noted. No scleral icterus. PERRL. EOM's grossly intact. Anterior chambers clear. NECK: Supple. Normal ROM. No meningismus. No thyroid tenderness or swelling noted. CARDIOVASCULAR: RRR. No Murmer. No carotid bruits. PULMONARY/CHEST WALL: Effort normal. No tachypnea. Lungs clear to ausculation. ABDOMEN: Normal BS. Soft. Morbidly obese. Nondistended. No tenderness to palpation. No guarding. No hernias noted. No splenomegaly. Back: Spine is midline. No ecchymosis. No crepituson palpation. No obvious subluxation of vertebral column. No saddle anesthesia or evidence of cauda equina. /ANORECTAL: Not assessed  MUSKULOSKELETAL: Patient does have some erythema and swelling to the left lower extremity, this is difficult to fully assess given the patient's body habitus, she does have palpable lymph nodes in the proximal left leg, there is warmth noted to the left lower extremity as well. Other extremities are atraumatic and nontender  SKIN: Warm and dry. NEUROLOGICAL:  GCS 15. CN II-XII grossly intact. Strength is 5/5 in all extremities and sensation is intact. PSYCHIATRIC: Normal affect, normal insight and judgement. Alert and oriented x 3. DIAGNOSTIC RESULTS:     LABS:    Results for orders placed or performed during the hospital encounter of 11/17/21   Culture, Urine    Specimen: Urine, clean catch   Result Value Ref Range    Urine Culture, Routine       <10,000 CFU/ml mixed skin/urogenital mine.  No further workup   CBC auto differential   Result Value Ref Range    WBC 9.4 4.0 - 11.0 K/uL    RBC 4.79 4.00 - 5.20 M/uL    Hemoglobin 13.8 12.0 - 16.0 g/dL    Hematocrit 40.6 36.0 - 48.0 %    MCV 84.9 80.0 - 100.0 fL    MCH 28.9 26.0 - 34.0 pg    MCHC 34.1 31.0 - 36.0 g/dL    RDW 14.3 12.4 - 15.4 %    Platelets 137 094 - 335 K/uL    MPV 8.1 5.0 - 10.5 fL    Neutrophils % 86.1 %    Lymphocytes % 7.3 %    Monocytes % 6.2 %    Eosinophils % 0.2 %    Basophils % 0.2 %    Neutrophils Absolute 8.1 (H) 1.7 - 7.7 K/uL    Lymphocytes Absolute 0.7 (L) 1.0 - 5.1 K/uL    Monocytes Absolute 0.6 0.0 - 1.3 K/uL    Eosinophils Absolute 0.0 0.0 - 0.6 K/uL    Basophils Absolute 0.0 0.0 - 0.2 K/uL   Comprehensive metabolic panel   Result Value Ref Range    Sodium 134 (L) 136 - 145 mmol/L    Potassium 5.0 3.5 - 5.1 mmol/L    Chloride 99 99 - 110 mmol/L    CO2 23 21 - 32 mmol/L    Anion Gap 12 3 - 16    Glucose 90 70 - 99 mg/dL    BUN 13 7 - 20 mg/dL    CREATININE 0.7 0.6 - 1.1 mg/dL    GFR Non-African American >60 >60    GFR African American >60 >60    Calcium 9.1 8.3 - 10.6 mg/dL    Total Protein 7.5 6.4 - 8.2 g/dL    Albumin 3.0 (L) 3.4 - 5.0 g/dL    Albumin/Globulin Ratio 0.7 (L) 1.1 - 2.2    Total Bilirubin 0.5 0.0 - 1.0 mg/dL    Alkaline Phosphatase 108 40 - 129 U/L    ALT 25 10 - 40 U/L    AST 40 (H) 15 - 37 U/L   Troponin   Result Value Ref Range    Troponin <0.01 <0.01 ng/mL   Urinalysis   Result Value Ref Range    Color, UA DARK YELLOW (A) Straw/Yellow    Clarity, UA CLOUDY (A) Clear    Glucose, Ur Negative Negative mg/dL    Bilirubin Urine MODERATE (A) Negative    Ketones, Urine 15 (A) Negative mg/dL    Specific Gravity, UA >=1.030 1.005 - 1.030    Blood, Urine SMALL (A) Negative    pH, UA 6.0 5.0 - 8.0    Protein, UA 30 (A) Negative mg/dL    Urobilinogen, Urine 1.0 <2.0 E.U./dL    Nitrite, Urine Negative Negative    Leukocyte Esterase, Urine TRACE (A) Negative    Microscopic Examination YES     Urine Type NotGiven    D-dimer, quantitative   Result Value Ref Range    D-Dimer, Quant 928 (H) 0 - 229 ng/mL DDU   Microscopic Urinalysis   Result Value Ref Range    WBC, UA 10-20 (A) 0 - 5 /HPF    RBC, UA 5-10 (A) 0 - 4 /HPF    Epithelial Cells, UA 11-20 (A) 0 - 5 /HPF    Bacteria, UA 1+ (A) None Seen /HPF         RADIOLOGY:  All x-ray studies are viewed/reviewed by me. Formal interpretations per the radiologist are as follows:      CT CHEST PULMONARY EMBOLISM W CONTRAST   Final Result   No evidence of a pulmonary embolus. Mildly dilated thoracic aorta with no aneurysm or dissection      Old healed granulomatous disease in the mediastinum      Mild cardiomegaly      Hazy ground-glass atelectasis or early infiltrates along the lung bases   posteriorly extending into the upper lobes. Recommend follow-up with serial   chest x-rays. EKG:  See EKG interpretation by an attending physician. PROCEDURES:   N/A    CRITICAL CARE TIME:   N/A    CONSULTS:  None      EMERGENCY DEPARTMENT COURSE andDIFFERENTIAL DIAGNOSIS/MDM:   Vitals:    Vitals:    11/17/21 2309 11/17/21 2337 11/18/21 0040 11/18/21 0110   BP: 134/73 (!) 142/67 131/66 117/67   Pulse: 88 91 96 89   Resp: 17 15 9 21   Temp:       TempSrc:       SpO2: 98% 94% 92% 97%   Weight:       Height:           Patient wasgiven the following medications:  Medications   ondansetron (ZOFRAN) injection 4 mg (4 mg IntraVENous Given 11/17/21 2305)   iopamidol (ISOVUE-370) 76 % injection 75 mL (75 mLs IntraVENous Given 11/17/21 2256)   apixaban (ELIQUIS) tablet 10 mg (10 mg Oral Given 11/18/21 0106)         Patient was evaluated independently by myself with the attending physician available for consultation. Patient presented to the emergency room today with complaints of left leg pain as well as swelling and erythema. Patient had some warmth on exam and given the fact that patient has been afebrile with no leukocytosis I think that a DVT is most likely cause, I did consider cellulitis as well.   I do not have ultrasound my disposal at the moment so I did start the patient on anticoagulation for this, given her dizziness I did do a CT of her chest which was negative for any PE. Patient was also given a prescription for antibiotics to start taking in the event that patient venous duplex is negative, I think that given her assessment is reasonable to treat for infection if is not a DVT the patient is not septic and I do not feel that she requires admission to the hospital, she was given strict return precautions. She was discharged in good condition, she verbalized understanding of the plan of care and agrees with it    Patient laboratory studies, radiographic imaging, and assessment were all discussed with the patient and/orpatient family. There was shared decision-making between myself as well as the patient and/or their surrogate and we are all in agreement with discharge home. There was an opportunity for questions and all questions were answered tothe best of my ability and to the satisfaction of the patient and/or patient family. FINAL IMPRESSION:      1. Leg swelling    2. Leg edema    3. Suspected DVT (deep vein thrombosis)          DISPOSITION/PLAN:   DISPOSITION Decision To Discharge      PATIENT REFERRED TO:  Mario Doss PA-C  [facility] [address]  [phone]    Call   For follow up      DISCHARGE MEDICATIONS:  Discharge Medication List as of 11/18/2021  1:04 AM      START taking these medications    Details   doxycycline hyclate (VIBRA-TABS) 100 MG tablet Take 1 tablet by mouth 2 times daily for 7 days, Disp-14 tablet, R-0Print      ondansetron (ZOFRAN ODT) 4 MG disintegrating tablet Take 1 tablet by mouth every 8 hours as needed for Nausea, Disp-20 tablet, R-0Print      oxyCODONE-acetaminophen (PERCOCET) 5-325 MG per tablet Take 1 tablet by mouth every 6 hours as needed for Pain for up to 3 days. WARNING:  May cause drowsiness. May impair ability to operate vehicles or machinery.   Do not use in combination with alcohol., Disp-10 tablet, R-0Print                        (Please note thatportions of this note were completed with a voice recognition program.  Efforts were made to edit the dictations, but occasionally words are mis-transcribed.)    SABRINA Greenberg - CNP-C (electronicallysigned)        SABRINA Greenberg CNP  11/21/21 4215

## 2022-02-24 ENCOUNTER — OFFICE VISIT (OUTPATIENT)
Dept: ORTHOPEDIC SURGERY | Age: 52
End: 2022-02-24
Payer: COMMERCIAL

## 2022-02-24 VITALS — WEIGHT: 293 LBS | BODY MASS INDEX: 48.82 KG/M2 | HEIGHT: 65 IN

## 2022-02-24 DIAGNOSIS — M17.11 PRIMARY OSTEOARTHRITIS OF RIGHT KNEE: Primary | ICD-10-CM

## 2022-02-24 PROCEDURE — 20610 DRAIN/INJ JOINT/BURSA W/O US: CPT | Performed by: ORTHOPAEDIC SURGERY

## 2022-02-24 RX ORDER — HYDROCODONE BITARTRATE AND ACETAMINOPHEN 5; 325 MG/1; MG/1
1 TABLET ORAL PRN
COMMUNITY

## 2022-02-24 NOTE — PROGRESS NOTES
She presents requesting cortisone injection to the right knee for diagnosis of osteoarthritis. Recommendation is for a cortisone injection into the right knee. After informed consent was received from the patient, the right knee was injected with 1 mL(40mg)Depo-Medrol and 4 mL of 0.25% Marcaine  in the syringe from an anterolateral joint line approach, using a 25-gauge needle, under sterile Betadine prep, using ethyl chloride as a topical refrigerant, for a diagnosis of osteoarthritis. The patient appeared to tolerate it well. The patient should return here periodically as needed. Encounter Diagnosis   Name Primary?  Primary osteoarthritis of right knee Yes        No orders of the defined types were placed in this encounter.

## 2022-05-31 ENCOUNTER — HOSPITAL ENCOUNTER (OUTPATIENT)
Age: 52
Discharge: HOME | End: 2022-05-31
Payer: COMMERCIAL

## 2022-05-31 ENCOUNTER — HOSPITAL ENCOUNTER (OUTPATIENT)
Dept: GENERAL RADIOLOGY | Age: 52
Discharge: HOME | End: 2022-05-31
Payer: COMMERCIAL

## 2022-05-31 DIAGNOSIS — R06.02 SHORTNESS OF BREATH: ICD-10-CM

## 2022-05-31 PROCEDURE — 71046 X-RAY EXAM CHEST 2 VIEWS: CPT

## 2022-09-13 ENCOUNTER — OFFICE VISIT (OUTPATIENT)
Dept: ORTHOPEDIC SURGERY | Age: 52
End: 2022-09-13
Payer: COMMERCIAL

## 2022-09-13 VITALS — WEIGHT: 293 LBS | HEIGHT: 65 IN | BODY MASS INDEX: 48.82 KG/M2

## 2022-09-13 DIAGNOSIS — M17.11 PRIMARY OSTEOARTHRITIS OF RIGHT KNEE: Primary | ICD-10-CM

## 2022-09-13 PROCEDURE — 20610 DRAIN/INJ JOINT/BURSA W/O US: CPT | Performed by: ORTHOPAEDIC SURGERY

## 2022-09-13 RX ORDER — TRIAMCINOLONE ACETONIDE 40 MG/ML
40 INJECTION, SUSPENSION INTRA-ARTICULAR; INTRAMUSCULAR ONCE
Status: COMPLETED | OUTPATIENT
Start: 2022-09-13 | End: 2022-09-13

## 2022-09-13 RX ORDER — BUPIVACAINE HYDROCHLORIDE 2.5 MG/ML
7 INJECTION, SOLUTION INFILTRATION; PERINEURAL ONCE
Status: COMPLETED | OUTPATIENT
Start: 2022-09-13 | End: 2022-09-13

## 2022-09-13 RX ORDER — METHYLPREDNISOLONE 4 MG/1
TABLET ORAL
Qty: 1 KIT | Refills: 1 | Status: SHIPPED | OUTPATIENT
Start: 2022-09-13

## 2022-09-13 RX ADMIN — BUPIVACAINE HYDROCHLORIDE 17.5 MG: 2.5 INJECTION, SOLUTION INFILTRATION; PERINEURAL at 13:42

## 2022-09-13 RX ADMIN — TRIAMCINOLONE ACETONIDE 40 MG: 40 INJECTION, SUSPENSION INTRA-ARTICULAR; INTRAMUSCULAR at 13:42

## 2022-09-13 NOTE — PROGRESS NOTES
She presents here for reevaluation of her right knee. She had a recurrence of pain has not been seen for quite some time. She has no history of injury but grades her pain 2/10 or worse by the end of the day. She also also is going to have a vein procedure done at the end of the month. This time on examination she has generalized pain some crepitus but no effusions or palpable Baker's cyst.  There is no gross instability. X-rays reviewed that she had taken in the past which demonstrate osteoarthritis. Impression: Right knee osteoarthritis, symptomatic    Recommendation: Right knee cortisone injection. She should return periodically and may consider viscosupplementation in the knee. Recommendation is for a cortisone injection into the right knee. After informed consent was received from the patient, the right knee was injected with 1 mL of 40mg/ml of Kenalog  and 4 mL of 0.25% Marcaine  in the syringe from an anterolateral joint line approach, using a 25-gauge needle, under sterile Betadine prep, using ethyl chloride as a topical refrigerant, for a diagnosis of osteoarthritis. The patient appeared to tolerate it well. The patient should return here periodically as needed. Encounter Diagnosis   Name Primary?     Primary osteoarthritis of right knee Yes        Orders Placed This Encounter   Procedures    61001 - [place] DRAIN/INJECT LARGE JOINT/BURSA

## 2022-09-21 ENCOUNTER — TELEPHONE (OUTPATIENT)
Dept: ORTHOPEDIC SURGERY | Age: 52
End: 2022-09-21

## 2022-09-21 NOTE — TELEPHONE ENCOUNTER
General Question     Subject: PATIENT IS REQUESTING A ORDER FOR A HANDICAP STICKER. PLEASE ADVISE.   PatienT Oscar Neigh  Contact Number: [phone]

## 2022-10-05 ENCOUNTER — TELEPHONE (OUTPATIENT)
Dept: ORTHOPEDIC SURGERY | Age: 52
End: 2022-10-05

## 2022-10-05 NOTE — TELEPHONE ENCOUNTER
General Question     Subject: HANDICAP STICKER   Patient and /or Facility Request: Reg Heller  Contact Number: [phone]    PATIENT CALLED IN TO GET A PRESCRIPTION FOR AN HANDICAP STICKER. Saulo Loo PATIENT WILLING TO  AT [facility] [place]. .. PLEASE CALL PATIENT BACK AT THE ABOVE NUMBER. ..

## 2022-10-10 NOTE — TELEPHONE ENCOUNTER
Spoke with patient. The [employer] won't accept the letter and is requesting a prescription order for the handicap placard. Patient is leaving for vacation and won't be back until next Tuesday, please mail it to her home address.

## 2022-10-27 NOTE — TELEPHONE ENCOUNTER
PATIENT STATES SHE HAS NOT RECEIVED PRESCRIPTION. PATIENT IS WANTING TO PICK PRESCRIPTION UP AT THE [facility] OFFICE. PLEASE ADVISE.

## 2022-11-01 ENCOUNTER — OFFICE VISIT (OUTPATIENT)
Dept: ORTHOPEDIC SURGERY | Age: 52
End: 2022-11-01
Payer: COMMERCIAL

## 2022-11-01 VITALS — BODY MASS INDEX: 48.82 KG/M2 | WEIGHT: 293 LBS | HEIGHT: 65 IN

## 2022-11-01 DIAGNOSIS — M17.0 PRIMARY OSTEOARTHRITIS OF BOTH KNEES: Primary | ICD-10-CM

## 2022-11-01 PROCEDURE — 20610 DRAIN/INJ JOINT/BURSA W/O US: CPT | Performed by: ORTHOPAEDIC SURGERY

## 2022-11-01 RX ORDER — BUPIVACAINE HYDROCHLORIDE 2.5 MG/ML
14 INJECTION, SOLUTION INFILTRATION; PERINEURAL ONCE
Status: COMPLETED | OUTPATIENT
Start: 2022-11-01 | End: 2022-11-01

## 2022-11-01 RX ORDER — TRIAMCINOLONE ACETONIDE 40 MG/ML
80 INJECTION, SUSPENSION INTRA-ARTICULAR; INTRAMUSCULAR ONCE
Status: COMPLETED | OUTPATIENT
Start: 2022-11-01 | End: 2022-11-01

## 2022-11-01 RX ADMIN — BUPIVACAINE HYDROCHLORIDE 35 MG: 2.5 INJECTION, SOLUTION INFILTRATION; PERINEURAL at 11:01

## 2022-11-01 RX ADMIN — TRIAMCINOLONE ACETONIDE 80 MG: 40 INJECTION, SUSPENSION INTRA-ARTICULAR; INTRAMUSCULAR at 11:01

## 2022-11-01 NOTE — PROGRESS NOTES
She returns today for evaluation. He said the cortisone helped to a degree but she still has recurrence of pain. On exam she has good range of motion but does have crepitus of both knees mostly in the right knee. She is not interested in surgery at this time but would like to consider intervention short of that. At this time she is aware that she has surgical risks and is amenable to injections. We discussed viscosupplementation in the past and after review of the literature she has elected undergo Durolane with cortisone injections today. Additionally I would recommend she be given a handicap parking placard for use of parking because of the stress put on her knees. Recommendation is for viscosupplementation using Durolane. The left and Right knee were injected with 3 ml of Durolane from an anterolateral joint line approach using a 22-gauge needle under sterile Betadine prep, using ethyl chloride as a topical refrigerant, for a diagnosis of osteoarthritis. The patient appeared to tolerate it well. The patient should return here in 2 months. Recommendation is for a cortisone injection into the right knee. After informed consent was received from the patient, the right knee was injected with 1 mL of 40mg/ml of Kenalog  and 4 mL of 0.25% Marcaine  in the syringe from an anterolateral joint line approach, using a 25-gauge needle, under sterile Betadine prep, using ethyl chloride as a topical refrigerant, for a diagnosis of osteoarthritis. The patient appeared to tolerate it well. The patient should return here periodically as needed. No diagnosis found. No orders of the defined types were placed in this encounter. Recommendation is for a cortisone injection into the left knee.  After informed consent was received from the patient, the left knee was injected with1 mL of 40mg/ml of Kenalog and 4 mL  of 0.25% Marcaine in the syringe from an anterolateral joint line approach, using a 25-gauge needle, under sterile Betadine prep, using ethyl chloride as a topical refrigerant, for a diagnosis of osteoarthritis. The patient appeared to tolerate it well. The patient should return here periodically as needed. No diagnosis found. No orders of the defined types were placed in this encounter.

## 2022-11-16 ENCOUNTER — TELEPHONE (OUTPATIENT)
Dept: ORTHOPEDIC SURGERY | Age: 52
End: 2022-11-16

## 2022-11-16 NOTE — TELEPHONE ENCOUNTER
General Question     Subject: MRI? Patient and /or Facility Request: Chelly White  Contact Number: [phone]     Pt HAD GEL SHOTS AND HAS GOTTEN SOME RELIEF, BUT THE SHARP PAIN IN THE R KNEE  IS STILL THERE WHEN SHE DOES ACTIVITIES/MOVEMENT. Pt ASKING DR ACUNA IF HE CAN PUT THE ORDER IN FOR AN MRI AS Pt HAS MET HER DEDUCTIBLE AND % RELIEVED OF HER PAIN. PLEASE CALL TO DISCUSS @ THE # ABOVE.

## 2022-11-17 NOTE — TELEPHONE ENCOUNTER
R/C TO PATIENT. LMOR WITH DIRECT CALL BACK #. NEED LOCATION REQUESTED FOR MRI RIGHT KNEE TO ENTER FOR AUTHORIZATION.

## 2022-11-21 ENCOUNTER — TELEPHONE (OUTPATIENT)
Dept: ORTHOPEDIC SURGERY | Age: 52
End: 2022-11-21

## 2022-11-21 DIAGNOSIS — M25.561 RIGHT KNEE PAIN, UNSPECIFIED CHRONICITY: Primary | ICD-10-CM

## 2022-11-21 NOTE — TELEPHONE ENCOUNTER
REQUEST FOR AUTHORIZATION MRI, RIGHT KNEE ENTERED. PATIENT REPORTS BRIEF IMPROVEMENT AFTER VISCO/CORTISONE INJECTIONS BUT SHARP PAIN CONTINUES WHEN DOING ACTIVITY. WOULD LIKE TO PROCEED WITH MRI AT THIS TIME D/T MINIMAL IMPROVEMENT WITH SEVERAL WEEKS OF CONSERVATIVE MEASURES.

## 2022-11-21 NOTE — TELEPHONE ENCOUNTER
Other    PATIENT IS RETURNING CALL REGARDING MRI LOCATION. WOULD LIKE TO GO TO [email or web address]. REQ EARLY MORNING OR AFTER 5PM APPT FOR MRI.      PLEASE ADVISE

## 2022-11-21 NOTE — TELEPHONE ENCOUNTER
SPOKE WITH PATIENT IN REGARDS TO MRI APPROVAL RIGHT KNEE. INFORMED PATIENT MRI ORDER ALONG WITH MRI AUTHORIZATION WAS FAXED TO [facility]. INFORMED PATIENT TO CALL AT THEIR CONVENIENCE TO SCHEDULE THAT MRI.  ALSO, INFORMED PATIENT TO CALL OUR SCHEDULING DEPT TO SCHEDULE FOLLOW UP APPOINTMENT  WITH DR ACUNA TO GO OVER THOSE RESULTS

## 2022-12-20 ENCOUNTER — OFFICE VISIT (OUTPATIENT)
Dept: ORTHOPEDIC SURGERY | Age: 52
End: 2022-12-20
Payer: COMMERCIAL

## 2022-12-20 VITALS — WEIGHT: 293 LBS | HEIGHT: 65 IN | BODY MASS INDEX: 48.82 KG/M2

## 2022-12-20 DIAGNOSIS — M17.11 PRIMARY OSTEOARTHRITIS OF RIGHT KNEE: ICD-10-CM

## 2022-12-20 DIAGNOSIS — M84.469G INSUFFICIENCY FRACTURE OF TIBIA WITH DELAYED HEALING, SUBSEQUENT ENCOUNTER: Primary | ICD-10-CM

## 2022-12-20 PROBLEM — M84.469A INSUFFICIENCY FRACTURE OF TIBIA: Status: ACTIVE | Noted: 2022-12-20

## 2022-12-20 PROCEDURE — G8484 FLU IMMUNIZE NO ADMIN: HCPCS | Performed by: ORTHOPAEDIC SURGERY

## 2022-12-20 PROCEDURE — 99213 OFFICE O/P EST LOW 20 MIN: CPT | Performed by: ORTHOPAEDIC SURGERY

## 2022-12-20 PROCEDURE — 1036F TOBACCO NON-USER: CPT | Performed by: ORTHOPAEDIC SURGERY

## 2022-12-20 PROCEDURE — G8417 CALC BMI ABV UP PARAM F/U: HCPCS | Performed by: ORTHOPAEDIC SURGERY

## 2022-12-20 PROCEDURE — 3017F COLORECTAL CA SCREEN DOC REV: CPT | Performed by: ORTHOPAEDIC SURGERY

## 2022-12-20 PROCEDURE — G8427 DOCREV CUR MEDS BY ELIG CLIN: HCPCS | Performed by: ORTHOPAEDIC SURGERY

## 2022-12-20 RX ORDER — BUPIVACAINE HYDROCHLORIDE 2.5 MG/ML
7 INJECTION, SOLUTION INFILTRATION; PERINEURAL ONCE
Status: SHIPPED | OUTPATIENT
Start: 2022-12-20

## 2022-12-20 RX ORDER — TRIAMCINOLONE ACETONIDE 40 MG/ML
40 INJECTION, SUSPENSION INTRA-ARTICULAR; INTRAMUSCULAR ONCE
Status: SHIPPED | OUTPATIENT
Start: 2022-12-20

## 2022-12-20 NOTE — LETTER
[facility]  Surgery Precert & Billing Form:    DEMOGRAPHICS:                                                                                                       Patient Name:  Lino Patricia  Patient :  1970   Patient SS#:      Patient Phone:  [phone] (home) [phone] (work) Alt. Patient Phone:    Patient Address:  [address] [place] [address]    PCP:  Kimmie Mcconnell DO  Insurance: [facility]    DIAGNOSIS & PROCEDURE:                                                                                      Diagnosis: RIGHT KNEE MEDIAL MENISCUS TEAR; INSUFFICIENCY FRACTURE OF MEDIAL TIBIAL PLATEAU; H79.028, I64.032V  Operation: right    SURGERY  INFORMATION  Date of Surgery:   2022  Location:    [facility]  Type:    Outpatient  23 hour hold:  No  Surgeon:       Riki Plata.  Tania Munoz MD   22     BILLING INFORMATION:                                                                                                Physician Procedure                                            CPT Codes                      PA, or Fellow Procedure                                      CPT Codes                      Precert information:

## 2022-12-20 NOTE — LETTER
Surgery Date: 12/30/2022 Surgery Time: 1:30 PM  Arrival Time: 11:30 AM      [facility] Surgery Scheduling    (Fax to [phone])    1. Patient Name: Praveen Alanis    2. Patient Type: [x] Outpatient [] Admit after   [] Inpatient      [] Post-procedure    3. Surgeon: Rodolfo Lomeli. Kirby Cortez MD      Assisting MD:     4. Procedure: RIGHT KNEE VA MEDIAL MENISECTOMY; INTERNAL FIXATION INSUFFICIENCY FRACTURE OF MEDIAL TIBIAL PLATEAU W/BONE SUBSTITUTE; 30392, 0707T      5. LATEX ALLERGY: [] YES [x] NO   Previous Surgery Date (if applicable):     6. Anesthesia Type: [] General   General/block [x]  [] Spinal/Block   [] Epidural [] MAC [] Local            [] None     7. Amount of time requested for procedure: 45 MINUTES   (Actual time may differ according to surgeon's average time in computer)      8. Equipment Needs: [x] C-Arm     [x]  KNEE IMMOBILIZER  []Phaco  [] Microscope   [] Needle Loc    HAND TRAY/CTR []   Other: CARDIAC CLEARANCE: N/A    9. Special Needs for the case: [address] [place] [address]           Name of company/rep to obtain special needs: [employer] [x]    [employer] []    10. Pre-Op Diagnosis: RIGHT KNEE MEDIAL MENISCUS TEAR; INSUFFICIENCY FRACTURE OF MEDIAL TIBIAL PLATEAU; N91.517, R17.440U    11. Any special admission testing other than routine:     12. Social Security Number:                             YOB: 1970    13. Address: [address]    14.  Phone Number: [phone] (home) [phone] (work)         * Alternate person to contact if unable to reach Patient *   _______________________________________  [address]: Payor: Shazia Galaviz / Plan: [facility] / Product Type: *No Product type* /       Scheduled By: Tressa Blanton ATC 12/20/2022              Hospital Use Only:  Information entered on: _____________________ By: ______________________________________                            Shannan Alves TO KACIE \[facility] [place] [facility]\""  [phone]  IN ACCORDANCE WITH OUR FORMULARY SYSTEM, A GENERIC EQUIVALENT DRUG MAY BE DISPENSED AND ADMINISTERED UNLESS \"D. A. W. \" IS WRITTEN WIT HTHE MEDICATION ORDER           Patient Name: Tara Ortega     : 1970                  DOS: 2022    Surgery: RIGHT KNEE VA MEDIAL MENISECTOMY; INTERNAL FIXATION INSUFFICIENCY FRACTURE OF MEDIAL TIBIAL PLATEAU W/BONE SUBSTITUTE; 33860, 0707T     Allergies: Allergies   Allergen Reactions    Bactrim [Sulfamethoxazole-Trimethoprim]      CAUSES SORES ON MOUTH    Mangifera Indica      Lip swelling / redness    No Known Allergies        Orthopedic Orders    Pre Surgery Testing:   X  Initiate Anesthesia Pre Surgery Testing Orders. Additional lab test:     [] EKG (must have medical reason)                                        [] CXR (must have medical reason)   X  Crutch or walker training in Physical Therapy at [facility]      X     H&P from [facility] Doctor   X   Total Joints  [] Urine for C&S       [] BMP        [] CBC    Hgb A1c            [] T/S   Including autologous donor    []   Admit patient for \"inpatient\" care status     Preoperative:   X  ALL ORTHOPEDIC CASES except Locals:   Cefazolin (Ancef®) IVPB per weight base protocol         Cefazolin 2 grams if <119.9 kg (263 lbs)         Cefazolin 3 grams if >120 kg (264 lbs)   If allergic to PCN or Cephalosporins give: Clindamycin 600 mg IVPB    []      []   For Total Joint Replacement Procedures:    Anesthesia Administer: Tranexamic acid 1 gm IV preop (in the OR) and repeat dosage at closure.         Postoperative:   X ALL ORTHOPEDIC CASES except Locals:   Cefazolin (Ancef®)  IVPB per weight base protocol      Cefazolin 2 gram if <119.9 kg (263 lbs)       Cefazolin 3 gram if > 120 kg   (264 lbs)    If allergic to PCN or Cephalosporins: Clindamycin 600 mg IVPB                                    22 3:47 PM        [facility] Marcia Araiza MD

## 2022-12-20 NOTE — PROGRESS NOTES
She returns today for evaluation of the MRI of her right knee. I did review the MRI which in fact did reveal medial meniscus tear consistent with her catching or locking but she also has a large area of stress reaction and medial tibial plateau where she points to with 1 finger where majority of her pain is located. Because of this I would say that she does have an insufficiency fracture or stress reaction and medial tibial plateau that would benefit from subchondral plasty. After an evaluation of this patient and a review of her radiographic testing, it would be my opinion that the symptoms, for which I am treating her are mechanical in origin. Although she has concomitant osteoarthritic changes, her joint space has greater than 50% of the articular surfaces left within the knee compartment. Additionally she is failed to improve over time with a physician directed physical therapy program, consideration of bracing, medications, and injections. It is with those observations both objective and subjective that I would recommend proceeding with arthroscopic intervention to her need to address the meniscal pathology. INFORMED CONSENT NOTE        We discussed the risks, benefits, and alternatives to the proposed procedure, as well as the necessity of other members of the healthcare team participating in the procedure. All questions were answered and the patient elected to proceed with the proposed procedure and signed the informed consent form. Detail Level: Detailed

## 2022-12-21 ENCOUNTER — TELEPHONE (OUTPATIENT)
Dept: ORTHOPEDIC SURGERY | Age: 52
End: 2022-12-21

## 2022-12-22 NOTE — TELEPHONE ENCOUNTER
Auth: # [id number]    Date: 12/30/22 thru 03/30/23  Type of SX:  Outpatient  Location: [facility]  CPT: 16218, 7950T   DX Code: M84.469G, M23.203  SX area: Rt knee  Insurance: [facility]

## 2022-12-27 ENCOUNTER — TELEPHONE (OUTPATIENT)
Dept: ORTHOPEDIC SURGERY | Age: 52
End: 2022-12-27

## 2022-12-27 NOTE — TELEPHONE ENCOUNTER
General Question     Subject: PATIENT REQUESTING A CALL ABOUT CALLS SHE GETTING TO DO A STUDY FOR DR. Antonia Gregory.   Patient: Marsha Dobson  Contact Number: [phone]

## 2022-12-30 ENCOUNTER — HOSPITAL ENCOUNTER (OUTPATIENT)
Age: 52
Setting detail: OUTPATIENT SURGERY
Discharge: HOME | End: 2022-12-30
Attending: ORTHOPAEDIC SURGERY | Admitting: ORTHOPAEDIC SURGERY
Payer: COMMERCIAL

## 2022-12-30 ENCOUNTER — ANESTHESIA (OUTPATIENT)
Dept: OPERATING ROOM | Age: 52
End: 2022-12-30
Payer: COMMERCIAL

## 2022-12-30 ENCOUNTER — ANESTHESIA EVENT (OUTPATIENT)
Dept: OPERATING ROOM | Age: 52
End: 2022-12-30
Payer: COMMERCIAL

## 2022-12-30 VITALS
WEIGHT: 293 LBS | SYSTOLIC BLOOD PRESSURE: 144 MMHG | RESPIRATION RATE: 16 BRPM | OXYGEN SATURATION: 100 % | DIASTOLIC BLOOD PRESSURE: 76 MMHG | BODY MASS INDEX: 50.02 KG/M2 | TEMPERATURE: 98.5 F | HEIGHT: 64 IN | HEART RATE: 66 BPM

## 2022-12-30 DIAGNOSIS — M22.42 CHONDROMALACIA PATELLAE OF LEFT KNEE: ICD-10-CM

## 2022-12-30 DIAGNOSIS — M23.203 OLD COMPLEX TEAR OF MEDIAL MENISCUS OF RIGHT KNEE: Primary | ICD-10-CM

## 2022-12-30 DIAGNOSIS — M17.11 PRIMARY OSTEOARTHRITIS OF RIGHT KNEE: ICD-10-CM

## 2022-12-30 DIAGNOSIS — M84.469G INSUFFICIENCY FRACTURE OF TIBIA WITH DELAYED HEALING, SUBSEQUENT ENCOUNTER: ICD-10-CM

## 2022-12-30 PROBLEM — S83.241A TEAR OF MEDIAL MENISCUS OF RIGHT KNEE: Status: ACTIVE | Noted: 2022-12-30

## 2022-12-30 PROCEDURE — 3700000000 HC ANESTHESIA ATTENDED CARE: Performed by: ORTHOPAEDIC SURGERY

## 2022-12-30 PROCEDURE — 2500000003 HC RX 250 WO HCPCS: Performed by: NURSE ANESTHETIST, CERTIFIED REGISTERED

## 2022-12-30 PROCEDURE — 6360000002 HC RX W HCPCS: Performed by: ANESTHESIOLOGY

## 2022-12-30 PROCEDURE — 2580000003 HC RX 258: Performed by: ANESTHESIOLOGY

## 2022-12-30 PROCEDURE — 6370000000 HC RX 637 (ALT 250 FOR IP): Performed by: ANESTHESIOLOGY

## 2022-12-30 PROCEDURE — 7100000011 HC PHASE II RECOVERY - ADDTL 15 MIN: Performed by: ORTHOPAEDIC SURGERY

## 2022-12-30 PROCEDURE — 0707T NJX B1 SUB MTRL SBCHDRL DFCT: CPT | Performed by: ORTHOPAEDIC SURGERY

## 2022-12-30 PROCEDURE — 6360000002 HC RX W HCPCS: Performed by: NURSE ANESTHETIST, CERTIFIED REGISTERED

## 2022-12-30 PROCEDURE — 3600000014 HC SURGERY LEVEL 4 ADDTL 15MIN: Performed by: ORTHOPAEDIC SURGERY

## 2022-12-30 PROCEDURE — 29881 ARTHRS KNE SRG MNISECTMY M/L: CPT | Performed by: ORTHOPAEDIC SURGERY

## 2022-12-30 PROCEDURE — 2709999900 HC NON-CHARGEABLE SUPPLY: Performed by: ORTHOPAEDIC SURGERY

## 2022-12-30 PROCEDURE — 2720000010 HC SURG SUPPLY STERILE: Performed by: ORTHOPAEDIC SURGERY

## 2022-12-30 PROCEDURE — 3700000001 HC ADD 15 MINUTES (ANESTHESIA): Performed by: ORTHOPAEDIC SURGERY

## 2022-12-30 PROCEDURE — 2580000003 HC RX 258: Performed by: NURSE ANESTHETIST, CERTIFIED REGISTERED

## 2022-12-30 PROCEDURE — 2500000003 HC RX 250 WO HCPCS: Performed by: ORTHOPAEDIC SURGERY

## 2022-12-30 PROCEDURE — 3600000004 HC SURGERY LEVEL 4 BASE: Performed by: ORTHOPAEDIC SURGERY

## 2022-12-30 PROCEDURE — 7100000001 HC PACU RECOVERY - ADDTL 15 MIN: Performed by: ORTHOPAEDIC SURGERY

## 2022-12-30 PROCEDURE — 64447 NJX AA&/STRD FEMORAL NRV IMG: CPT | Performed by: ANESTHESIOLOGY

## 2022-12-30 PROCEDURE — C1713 ANCHOR/SCREW BN/BN,TIS/BN: HCPCS | Performed by: ORTHOPAEDIC SURGERY

## 2022-12-30 PROCEDURE — 6360000002 HC RX W HCPCS: Performed by: ORTHOPAEDIC SURGERY

## 2022-12-30 PROCEDURE — A4216 STERILE WATER/SALINE, 10 ML: HCPCS | Performed by: ANESTHESIOLOGY

## 2022-12-30 PROCEDURE — 7100000000 HC PACU RECOVERY - FIRST 15 MIN: Performed by: ORTHOPAEDIC SURGERY

## 2022-12-30 PROCEDURE — 7100000010 HC PHASE II RECOVERY - FIRST 15 MIN: Performed by: ORTHOPAEDIC SURGERY

## 2022-12-30 PROCEDURE — 2500000003 HC RX 250 WO HCPCS: Performed by: ANESTHESIOLOGY

## 2022-12-30 DEVICE — IMPLANTABLE DEVICE
Type: IMPLANTABLE DEVICE | Site: KNEE | Status: FUNCTIONAL
Brand: SCP® PF KNEE KIT

## 2022-12-30 RX ORDER — HYDROMORPHONE HCL 110MG/55ML
PATIENT CONTROLLED ANALGESIA SYRINGE INTRAVENOUS PRN
Status: DISCONTINUED | OUTPATIENT
Start: 2022-12-30 | End: 2022-12-30 | Stop reason: SDUPTHER

## 2022-12-30 RX ORDER — ONDANSETRON 2 MG/ML
4 INJECTION INTRAMUSCULAR; INTRAVENOUS EVERY 10 MIN PRN
Status: DISCONTINUED | OUTPATIENT
Start: 2022-12-30 | End: 2022-12-30 | Stop reason: HOSPADM

## 2022-12-30 RX ORDER — DEXAMETHASONE SODIUM PHOSPHATE 10 MG/ML
INJECTION, SOLUTION INTRAMUSCULAR; INTRAVENOUS PRN
Status: DISCONTINUED | OUTPATIENT
Start: 2022-12-30 | End: 2022-12-30 | Stop reason: SDUPTHER

## 2022-12-30 RX ORDER — BUPIVACAINE HYDROCHLORIDE 2.5 MG/ML
INJECTION, SOLUTION EPIDURAL; INFILTRATION; INTRACAUDAL
Status: DISCONTINUED
Start: 2022-12-30 | End: 2022-12-30 | Stop reason: HOSPADM

## 2022-12-30 RX ORDER — SODIUM CHLORIDE 0.9 % (FLUSH) 0.9 %
5-40 SYRINGE (ML) INJECTION PRN
Status: DISCONTINUED | OUTPATIENT
Start: 2022-12-30 | End: 2022-12-30 | Stop reason: SDUPTHER

## 2022-12-30 RX ORDER — PROPOFOL 10 MG/ML
INJECTION, EMULSION INTRAVENOUS PRN
Status: DISCONTINUED | OUTPATIENT
Start: 2022-12-30 | End: 2022-12-30 | Stop reason: SDUPTHER

## 2022-12-30 RX ORDER — MEPERIDINE HYDROCHLORIDE 25 MG/ML
12.5 INJECTION INTRAMUSCULAR; INTRAVENOUS; SUBCUTANEOUS EVERY 5 MIN PRN
Status: DISCONTINUED | OUTPATIENT
Start: 2022-12-30 | End: 2022-12-30 | Stop reason: HOSPADM

## 2022-12-30 RX ORDER — SODIUM CHLORIDE 0.9 % (FLUSH) 0.9 %
5-40 SYRINGE (ML) INJECTION EVERY 12 HOURS SCHEDULED
Status: DISCONTINUED | OUTPATIENT
Start: 2022-12-30 | End: 2022-12-30 | Stop reason: HOSPADM

## 2022-12-30 RX ORDER — SODIUM CHLORIDE 0.9 % (FLUSH) 0.9 %
5-40 SYRINGE (ML) INJECTION EVERY 12 HOURS SCHEDULED
Status: DISCONTINUED | OUTPATIENT
Start: 2022-12-30 | End: 2022-12-30 | Stop reason: SDUPTHER

## 2022-12-30 RX ORDER — SODIUM CHLORIDE, SODIUM LACTATE, POTASSIUM CHLORIDE, CALCIUM CHLORIDE 600; 310; 30; 20 MG/100ML; MG/100ML; MG/100ML; MG/100ML
INJECTION, SOLUTION INTRAVENOUS CONTINUOUS PRN
Status: DISCONTINUED | OUTPATIENT
Start: 2022-12-30 | End: 2022-12-30 | Stop reason: SDUPTHER

## 2022-12-30 RX ORDER — OXYCODONE HYDROCHLORIDE AND ACETAMINOPHEN 5; 325 MG/1; MG/1
1 TABLET ORAL ONCE
Status: COMPLETED | OUTPATIENT
Start: 2022-12-30 | End: 2022-12-30

## 2022-12-30 RX ORDER — MIDAZOLAM HYDROCHLORIDE 1 MG/ML
1 INJECTION INTRAMUSCULAR; INTRAVENOUS EVERY 5 MIN PRN
Status: DISCONTINUED | OUTPATIENT
Start: 2022-12-30 | End: 2022-12-30 | Stop reason: HOSPADM

## 2022-12-30 RX ORDER — LIDOCAINE HYDROCHLORIDE 20 MG/ML
INJECTION, SOLUTION INFILTRATION; PERINEURAL PRN
Status: DISCONTINUED | OUTPATIENT
Start: 2022-12-30 | End: 2022-12-30 | Stop reason: SDUPTHER

## 2022-12-30 RX ORDER — OXYCODONE HYDROCHLORIDE AND ACETAMINOPHEN 5; 325 MG/1; MG/1
1 TABLET ORAL EVERY 6 HOURS PRN
Qty: 28 TABLET | Refills: 0 | Status: SHIPPED
Start: 2022-12-30 | End: 2022-12-31 | Stop reason: ALTCHOICE

## 2022-12-30 RX ORDER — SODIUM CHLORIDE 9 MG/ML
25 INJECTION, SOLUTION INTRAVENOUS PRN
Status: DISCONTINUED | OUTPATIENT
Start: 2022-12-30 | End: 2022-12-30 | Stop reason: SDUPTHER

## 2022-12-30 RX ORDER — ROCURONIUM BROMIDE 10 MG/ML
INJECTION, SOLUTION INTRAVENOUS PRN
Status: DISCONTINUED | OUTPATIENT
Start: 2022-12-30 | End: 2022-12-30 | Stop reason: SDUPTHER

## 2022-12-30 RX ORDER — HYDRALAZINE HYDROCHLORIDE 20 MG/ML
5 INJECTION INTRAMUSCULAR; INTRAVENOUS
Status: DISCONTINUED | OUTPATIENT
Start: 2022-12-30 | End: 2022-12-30 | Stop reason: HOSPADM

## 2022-12-30 RX ORDER — LIDOCAINE HYDROCHLORIDE 10 MG/ML
0.3 INJECTION, SOLUTION EPIDURAL; INFILTRATION; INTRACAUDAL; PERINEURAL
Status: DISCONTINUED | OUTPATIENT
Start: 2022-12-30 | End: 2022-12-30 | Stop reason: HOSPADM

## 2022-12-30 RX ORDER — SODIUM CHLORIDE 9 MG/ML
INJECTION, SOLUTION INTRAVENOUS PRN
Status: DISCONTINUED | OUTPATIENT
Start: 2022-12-30 | End: 2022-12-30 | Stop reason: HOSPADM

## 2022-12-30 RX ORDER — ONDANSETRON 2 MG/ML
INJECTION INTRAMUSCULAR; INTRAVENOUS PRN
Status: DISCONTINUED | OUTPATIENT
Start: 2022-12-30 | End: 2022-12-30 | Stop reason: SDUPTHER

## 2022-12-30 RX ORDER — SODIUM CHLORIDE 9 MG/ML
INJECTION, SOLUTION INTRAVENOUS PRN
Status: DISCONTINUED | OUTPATIENT
Start: 2022-12-30 | End: 2022-12-30 | Stop reason: SDUPTHER

## 2022-12-30 RX ORDER — BUPIVACAINE HYDROCHLORIDE 2.5 MG/ML
INJECTION, SOLUTION INFILTRATION; PERINEURAL PRN
Status: DISCONTINUED | OUTPATIENT
Start: 2022-12-30 | End: 2022-12-30 | Stop reason: ALTCHOICE

## 2022-12-30 RX ORDER — DIPHENHYDRAMINE HYDROCHLORIDE 50 MG/ML
12.5 INJECTION INTRAMUSCULAR; INTRAVENOUS
Status: DISCONTINUED | OUTPATIENT
Start: 2022-12-30 | End: 2022-12-30 | Stop reason: HOSPADM

## 2022-12-30 RX ORDER — SODIUM CHLORIDE 0.9 % (FLUSH) 0.9 %
5-40 SYRINGE (ML) INJECTION PRN
Status: DISCONTINUED | OUTPATIENT
Start: 2022-12-30 | End: 2022-12-30 | Stop reason: HOSPADM

## 2022-12-30 RX ADMIN — SUGAMMADEX 300 MG: 100 INJECTION, SOLUTION INTRAVENOUS at 12:13

## 2022-12-30 RX ADMIN — LIDOCAINE HYDROCHLORIDE 60 MG: 20 INJECTION, SOLUTION INFILTRATION; PERINEURAL at 11:45

## 2022-12-30 RX ADMIN — PROPOFOL 200 MG: 10 INJECTION, EMULSION INTRAVENOUS at 11:45

## 2022-12-30 RX ADMIN — Medication 3000 MG: at 11:45

## 2022-12-30 RX ADMIN — HYDROMORPHONE HYDROCHLORIDE 0.5 MG: 1 INJECTION, SOLUTION INTRAMUSCULAR; INTRAVENOUS; SUBCUTANEOUS at 12:43

## 2022-12-30 RX ADMIN — ONDANSETRON HYDROCHLORIDE 4 MG: 2 INJECTION, SOLUTION INTRAMUSCULAR; INTRAVENOUS at 11:50

## 2022-12-30 RX ADMIN — HYDROMORPHONE HYDROCHLORIDE 0.5 MG: 2 INJECTION, SOLUTION INTRAMUSCULAR; INTRAVENOUS; SUBCUTANEOUS at 11:50

## 2022-12-30 RX ADMIN — ROCURONIUM BROMIDE 50 MG: 10 INJECTION, SOLUTION INTRAVENOUS at 11:45

## 2022-12-30 RX ADMIN — SODIUM CHLORIDE, POTASSIUM CHLORIDE, SODIUM LACTATE AND CALCIUM CHLORIDE: 600; 310; 30; 20 INJECTION, SOLUTION INTRAVENOUS at 11:41

## 2022-12-30 RX ADMIN — FAMOTIDINE 20 MG: 10 INJECTION, SOLUTION INTRAVENOUS at 09:50

## 2022-12-30 RX ADMIN — OXYCODONE HYDROCHLORIDE AND ACETAMINOPHEN 1 TABLET: 5; 325 TABLET ORAL at 12:57

## 2022-12-30 RX ADMIN — DEXAMETHASONE SODIUM PHOSPHATE 8 MG: 10 INJECTION, SOLUTION INTRAMUSCULAR; INTRAVENOUS at 11:50

## 2022-12-30 RX ADMIN — HYDROMORPHONE HYDROCHLORIDE 0.5 MG: 1 INJECTION, SOLUTION INTRAMUSCULAR; INTRAVENOUS; SUBCUTANEOUS at 12:31

## 2022-12-30 ASSESSMENT — PAIN SCALES - GENERAL
PAINLEVEL_OUTOF10: 5
PAINLEVEL_OUTOF10: 7
PAINLEVEL_OUTOF10: 5

## 2022-12-30 ASSESSMENT — PAIN DESCRIPTION - LOCATION
LOCATION: KNEE

## 2022-12-30 ASSESSMENT — PAIN DESCRIPTION - DESCRIPTORS
DESCRIPTORS: BURNING
DESCRIPTORS: THROBBING;ACHING
DESCRIPTORS: SHARP
DESCRIPTORS: BURNING;THROBBING

## 2022-12-30 ASSESSMENT — PAIN - FUNCTIONAL ASSESSMENT
PAIN_FUNCTIONAL_ASSESSMENT: PREVENTS OR INTERFERES SOME ACTIVE ACTIVITIES AND ADLS
PAIN_FUNCTIONAL_ASSESSMENT: 0-10

## 2022-12-30 ASSESSMENT — PAIN DESCRIPTION - ORIENTATION
ORIENTATION: RIGHT

## 2022-12-30 NOTE — OP NOTE
[address]                 [address] [address]                                OPERATIVE REPORT    PATIENT NAME: Tamera Cruz                     :        1970  MED REC NO:   [id number]                          ROOM:  ACCOUNT NO:   [de-identified]                           ADMIT DATE: 2022  PROVIDER:     Tim Maurer MD    DATE OF PROCEDURE:  2022    PREOPERATIVE DIAGNOSIS:  Right knee osteoarthritis with degenerative  medial meniscus tear and insufficiency fracture of medial tibial  plateau. POSTOPERATIVE DIAGNOSIS:  Right knee osteoarthritis with degenerative  medial meniscus tear and insufficiency fracture of medial tibial  plateau. OPERATION PERFORMED:  Right knee video arthroscopy with partial medial  meniscectomy and internal fixation of both insufficiency fracture of  medial tibial plateau with bone substitute. SURGEON:  Tim Maurer MD    ANESTHESIA:  General, leg block. BLOOD LOSS:  Less than 5 mL. COMPLICATIONS:  None noted. INDICATIONS FOR OPERATION:  This 44-year-old female who presented with  right knee pain. An MRI confirmed she had stress reaction of medial  tibial plateau and a complex tear of the posterior third of medial  meniscus. After discussion of treatment options, she elected for  further recommendations of the surgical intervention. DESCRIPTION OF THE OPERATION:  The patient was taken to the operating  room where general anesthetic was obtained. Antibiotics had been given  IV piggyback preoperatively and a leg block was performed  preoperatively. Right knee and leg were sterilely prepped and draped,  and a two-port arthroscopy of the right knee was carried out in the  usual fashion using a 30-degree arthroscope. Upon entry into the knee,  she was noted to have a normal patellofemoral articulation with grade 1  and 2 chondromalacia of the patella.   She had a complex tear through the  junction of the posterior middle third of the medial meniscus and some  focal grade 4 changes. ACL, PCL, and lateral compartments were free of  pathology. Partial medial meniscectomy was performed with both rigid  and power instrumentation leaving a well-contoured and balanced inner  rim. The knee was then thoroughly irrigated and evacuated of all loose  debris and instilled with 20 mL of 0.25% Marcaine plain. Port was  repaired with 4-0 nylon in a figure-of-eight fashion. Based on preoperative review of this patient's right knee MRI, the  location of the bone marrow lesion, consistent with an insufficiency or  stress fracture, in the medial tibial plateau was identified. Preoperative surgical planning allowed for determination of the optimal  method for accessing the lesion. Intraoperatively, image fluoroscopy  combined with bone targeting instruments from [address] [place] [address] were  used to guide surgical instruments into the proximity of the subchondral  medial tibial plateau fracture. Specifically, the [employer]  AccuPort injection cannula was placed in the subchondral bone. Standard  repair methodology was used to treat the subchondral bone defect in the  medial tibial plateau. Image fluoroscopy was utilized to confirm  accurate insertion of the AccuPort injection cannula into the  subchondral bone. After insertion, fracture stabilization was performed  by injecting 4 mL of [employer] AccuFill bone substitute into  the medial tibial plateau. Image fluoroscopy was used to monitor the  injection process and ensure injection of the bone substitute into the  subchondral bone, so that following polymerization the bone substitute  stabilized the fracture and facilitated fracture repair. The injection  wounds were closed and sterile dressing was applied.         Blanca Gambino MD    D: 12/30/2022 12:18:40       T: 12/30/2022 15:44:10     [email or web address]  Job#: [id number]     Doc#: [id number]    CC:

## 2022-12-30 NOTE — ANESTHESIA PRE PROCEDURE
Department of Anesthesiology  Preprocedure Note       Name:  Florencio Lomeli   Age:  47 y.o.  :  1970                                          MRN:  [id number]         Date:  2022      Surgeon: Hailey San):  Everette Lewis MD    Procedure: Procedure(s):  RIGHT KNEE VIDEO ARTHROSCOPY, MEDIAL MENISECTOMY, INTERNAL FIXATION INSUFFICIENCY FRACTURE OF MEDIAL TIBIAL PLATEAU WITH BONE SUBSTITUTE *GEN W/BLOCK*    Medications prior to admission:   Prior to Admission medications    Medication Sig Start Date End Date Taking? Authorizing Provider   potassium chloride (KLOR-CON) 20 MEQ packet Take 20 mEq by mouth as needed    Historical Provider, MD   Naproxen Sodium 220 MG CAPS Take 1 tablet by mouth    Historical Provider, MD   furosemide (LASIX) 20 MG tablet Take 20 mg by mouth daily as needed 3/27/17   Historical Provider, MD       Current medications:    Current Facility-Administered Medications   Medication Dose Route Frequency Provider Last Rate Last Admin    sodium chloride flush 0.9 % injection 5-40 mL  5-40 mL IntraVENous 2 times per day Stephania Sandoval MD        sodium chloride flush 0.9 % injection 5-40 mL  5-40 mL IntraVENous PRN Stephania Sandoval MD        0.9 % sodium chloride infusion   IntraVENous PRN Stephania Sandoval MD        ceFAZolin (ANCEF) 3000 mg in dextrose 5 % 100 mL IVPB  3,000 mg IntraVENous Once Everette Lewis MD        lidocaine PF 1 % injection 0.3 mL  0.3 mL IntraDERmal Once PRN David Henderson MD           Allergies:     Allergies   Allergen Reactions    Bactrim [Sulfamethoxazole-Trimethoprim]      CAUSES SORES ON MOUTH    Mangifera Indica      Lip swelling / redness       Problem List:    Patient Active Problem List   Diagnosis Code    Primary osteoarthritis of both knees M17.0    Morbid obesity ([address] E66.01    Acute pain of right knee M25.561    Capsulitis of foot M77.8    Other synovitis and tenosynovitis, unspecified ankle and foot M65.879  Localized osteoarthritis of left knee M17.12    Loose body of left knee M23.42    Chondromalacia patellae of left knee M22.42    Left hip pain M25.552    Primary osteoarthritis of right knee M17.11    Greater trochanteric bursitis of left hip M70.62    Insufficiency fracture of tibia M84.469A       Past Medical History:        Diagnosis Date    Back pain     Greater trochanteric bursitis of left hip 10/14/2021    Insufficiency fracture of tibia 12/20/2022    Kienbock disease, adult     Kienbock disease, adult 2002    Kienbock disease, adult 2007    Kienbock disease, adult 2007    Localized osteoarthritis of left knee 1/24/2018    Other synovitis and tenosynovitis, unspecified ankle and foot 8/8/2017    Reflux     Sinusitis     Sleep apnea     NOT USING       Past Surgical History:        Procedure Laterality Date    APPENDECTOMY      BREAST BIOPSY      multiple bilateral biopsies    BREAST SURGERY      bilateral lumpectomy    CARPAL TUNNEL RELEASE      bilateral    CHOLECYSTECTOMY      HAND SURGERY Left     HAND    HYSTEROSCOPY      KNEE ARTHROSCOPY Left 8/17/2020    LEFT KNEE VIDEO ARTHROSCOPY, CHONDROPLASTY OF PATELLA performed by Roly Aguayo MD at [address] [place] [address] Left     MENISCUS    NASAL SEPTUM SURGERY  08/2018    OTHER SURGICAL HISTORY Left 01/26/2018    left knee DVA with LBR and abrasion arthroplasty    OTHER SURGICAL HISTORY      LEFT KNEE VIDEO ARTHROSCOPY, CHONDROPLASTY OF PATELLA (Left Knee)        Social History:    Social History     Tobacco Use    Smoking status: Never    Smokeless tobacco: Never   Substance Use Topics    Alcohol use:  No                                Counseling given: Not Answered      Vital Signs (Current):   Vitals:    12/30/22 0927   BP: 123/66   Pulse: 73   Resp: 20   Temp: 98.6 °F (37 °C)   TempSrc: Temporal   SpO2: 95%   Weight: (!) 330 lb (149.7 kg)   Height: 5' 3.5\" (1.613 m)                                              BP Component Value Date/Time    COVID19 NOT DETECTED 08/11/2020 03:39 PM           Anesthesia Evaluation  Patient summary reviewed and Nursing notes reviewed no history of anesthetic complications:   Airway: Mallampati: II  TM distance: >3 FB   Neck ROM: full  Mouth opening: > = 3 FB   Dental: normal exam         Pulmonary:   (+) sleep apnea:                             Cardiovascular:Negative CV ROS                      Neuro/Psych:   Negative Neuro/Psych ROS              GI/Hepatic/Renal:   (+) morbid obesity     (-) GERD, liver disease and no renal disease       Endo/Other: Negative Endo/Other ROS       (-) diabetes mellitus               Abdominal:             Vascular: negative vascular ROS. Other Findings:           Anesthesia Plan      general and regional     ASA 3     (I discussed with the patient the risks and benefits of PIV, general anesthesia, IV Narcotics, PACU. All questions were answered the patient agrees with the plan. Adductor canal block for post op pain.)  Induction: intravenous. MIPS: Prophylactic antiemetics administered. Anesthetic plan and risks discussed with patient. Plan discussed with CRNA.                     Woo Helms MD   12/30/2022

## 2022-12-30 NOTE — BRIEF OP NOTE
Brief Postoperative Note      Patient: Sweetie Mcconnell  YOB: 1970  MRN: [id number]    Date of Procedure: 12/30/2022    Pre-Op Diagnosis: Tear of medial meniscus of right knee, unspecified tear type, unspecified whether old or current tear, initial encounter [S83.241A]  Insufficiency fracture of tibia with delayed healing, subsequent encounter [M84.469G]    Post-Op Diagnosis: Same       Procedure(s):  RIGHT KNEE VIDEO ARTHROSCOPY, MEDIAL MENISECTOMY, INTERNAL FIXATION INSUFFICIENCY FRACTURE OF MEDIAL TIBIAL PLATEAU WITH BONE SUBSTITUTE    Surgeon(s):  Rosalva Alfaro MD    Assistant:  Surgical Assistant: Olga Lidia Colmenares    Anesthesia: General    Estimated Blood Loss (mL): Minimal    Complications: None    Specimens:   * No specimens in log *    Implants:  Implant Name Type Inv.  Item Serial No.  Lot No. LRB No. Used Action   KIT KNEE SCP - [id number]  [employer] SCP  [employer] [id number] Right 1 Implanted         Drains: * No LDAs found *    Findings: ignacio    Electronically signed by Rosalva Alfaro MD on 12/30/2022 at 12:04 PM

## 2022-12-30 NOTE — ANESTHESIA PROCEDURE NOTES
Peripheral Block    Patient location during procedure: pre-op  Reason for block: post-op pain management and at surgeon's request  Start time: 12/30/2022 10:55 AM  End time: 12/30/2022 11:05 AM  Staffing  Performed: anesthesiologist   Anesthesiologist: Emma Herrera MD  Preanesthetic Checklist  Completed: patient identified, IV checked, site marked, risks and benefits discussed, surgical/procedural consents, equipment checked, pre-op evaluation, timeout performed, anesthesia consent given, oxygen available and monitors applied/VS acknowledged  Peripheral Block   Patient position: supine  Prep: ChloraPrep  Provider prep: mask and sterile gloves  Patient monitoring: cardiac monitor, continuous pulse ox, frequent blood pressure checks and IV access  Block type: Femoral  Adductor canal (Low Femoral)  Laterality: right  Injection technique: single-shot  Guidance: ultrasound guided  Local infiltration: lidocaine  Infiltration strength: 1 %  Local infiltration: lidocaine  Dose: 3 mL    Needle   Needle gauge: 21 G  Needle localization: ultrasound guidance  Needle length: 10 cm  Assessment   Injection assessment: negative aspiration for heme, no paresthesia on injection, local visualized surrounding nerve on ultrasound and no intravascular symptoms  Paresthesia pain: none  Slow fractionated injection: yes  Hemodynamics: stableno  Outcomes: uncomplicated and patient tolerated procedure well    Additional Notes  Immediately prior to procedure a \"time out\" was called to verify the correct patient, allergies, laterality, procedure and equipment. Time out performed with  RN    Local Anesthetic: 0.25 %  Bupivacaine   Amount: 30 ml  in 5 ml increments after negative aspiration each time. Iliopsoas Muscle and Fascia Iliaca, Femoral artery (Deep artery to the thigh take off), Femoral Vein and Femoral Nerve are identified; the tip of the need and the spread of the local anesthetic around the Femoral nerve are visualized.  The Femoral nerve appeared to be anatomically normal and there were no abnormal pathologically findings seen.

## 2022-12-30 NOTE — ANESTHESIA POSTPROCEDURE EVALUATION
Department of Anesthesiology  Postprocedure Note    Patient: Alton Hennessy  MRN: [id number]  YOB: 1970  Date of evaluation: 12/30/2022      Procedure Summary     Date: 12/30/22 Room / Location: [facility] OR  / [facility]    Anesthesia Start: 9617 Anesthesia Stop: 3447    Procedure: RIGHT KNEE VIDEO ARTHROSCOPY, MEDIAL MENISECTOMY, INTERNAL FIXATION INSUFFICIENCY FRACTURE OF MEDIAL TIBIAL PLATEAU WITH BONE SUBSTITUTE (Right: Knee) Diagnosis:       Tear of medial meniscus of right knee, unspecified tear type, unspecified whether old or current tear, initial encounter      Insufficiency fracture of tibia with delayed healing, subsequent encounter      (Tear of medial meniscus of right knee, unspecified tear type, unspecified whether old or current tear, initial encounter [S83.241A])      (Insufficiency fracture of tibia with delayed healing, subsequent encounter [M84.469G])    Surgeons: Bridgette Prasad MD Responsible Provider: Kendra Grajeda MD    Anesthesia Type: general, regional ASA Status: 3          Anesthesia Type: No value filed.     Magnolia Phase I: Magnolia Score: 10    Magnolia Phase II: Magnolia Score: 10      Anesthesia Post Evaluation    Patient location during evaluation: PACU  Level of consciousness: awake  Airway patency: patent  Nausea & Vomiting: no nausea  Complications: no  Cardiovascular status: blood pressure returned to baseline  Respiratory status: acceptable  Hydration status: euvolemic

## 2022-12-30 NOTE — DISCHARGE INSTRUCTIONS
Follow up with Dr. Ketan Wilson per arrangements. Weight bearing 50% x 2 days with crutches, then as tolerated. Remove dressing in 2-3 days, then may shower. Ice and elevate knee as tolerated. PERIPHERAL NERVE BLOCK INSTRUCTIONS     Please remember while having a nerve block you are at an increased risk for [address]!! You were given a nerve block today from the anesthesiologist. Most nerve blocks last anywhere from 6-36 hours. You should start taking your pain medication before the block wears off or when you first begin feeling discomfort. It takes at least 30-60 minutes for a pain pill to take effect. Pain medications should be taken with food. Consider setting an alarm through the night to help manage your pain level so you do not wake up with too much pain. Pain medicines can cause more sedation and decrease your breathing so ONLY take as directed. If you have Sleep Apnea, you definitely need to use your C-Pap machine. What to expect after a nerve block:   Numbness, tingling- arm or leg feels heavy or asleep  Weakness or inability to move or control your arm or leg   Inability to feel temperature changes to your arm or leg  Usually the weakness wears off first, followed by the tingling or heaviness. You may notice more pain at this point and should start taking your pain meds. If you had a shoulder block, you may experience:  Mild shortness of breath (may be relieved by sitting up in a chair or recliner)  Hoarse voice  Blurry vision  Unequal pupils  Drooping of your face (eye or lip) on the same side as the nerve block  Swelling at the injection site on the side of your neck  These side effects should resolve as the block wears off! IF you have severe or prolonged shortness of breath-  GO to the nearest Emergency Room!    If you had a nerve block of your arm or leg:  Protect the arm or leg from extreme hot or cold temperatures  Protect the arm or leg from obstructing blood flow by frequent position changes- Make sure fingers/ toes stay pink and warm. Call surgeon with any changes  For leg block, get assistance walking until the block wears off, ie:  crutches, walker, support person   If you continue to feel the effects of the nerve block for longer than 72 hours- call [facility] at [phone] and ask to speak with the Anesthesiologist on call. ANESTHESIA DISCHARGE INSTRUCTIONS    You are under the influence of drugs- do not drink alcohol, drive a car, operate machinery(such as power tools, kitchen appliances, etc), sign legal documents, or make any important decisions for 24 hours (or while on pain medications). Children should not ride bikes or [place] or play on gym sets  for 24 hours after surgery. A responsible adult should be with you for 24 hours. Rest at home today- increase activity as tolerated. Progress slowly to a regular diet unless your physician has instructed you otherwise. Drink plenty of water. CALL YOUR DOCTOR IF YOU:  Have moderate to severe nausea or vomiting AND are unable to hold down fluids or prescribed medications. Have bright red bloody drainage from your dressing that won't stop oozing. Do not get relief with your pain medication    NORMAL (POSSIBLE) SIDE EFFECTS FROM ANESTHESIA:     Confusion, temporary memory loss, delayed reaction times in the first 24 hours  Lightheadedness, dizziness, difficulty focusing, blurred vision  Nausea/vomiting can happen  Shivering, feeling cold, sore throat, cough and muscle aches should stop within 24-48 hours  Trouble urinating - call your surgeon if it has been more than 8 hrs  Bruising or soreness at the IV site - call if it remains red, firm or there is drainage             FEMALES OF CHILDBEARING AGE WHO ARE TAKING BIRTH CONTROL PILLS:  You may have received a medication during your procedure that interferes with the   actions of birth control pills (Bridion or Emend).  Use some other kind of birth control in addition to your pills, like a condom, for 1 month after your procedure to prevent unwanted pregnancy. The following instructions are to be followed if you have a known history or diagnosis of sleep apnea: For all sleep apnea patients:  ? Sleep on your side or sitting up in a chair whenever possible, especially the first 24 hours after surgery. ? Use only medicines prescribed by your doctor. ? Do not drink alcohol. ? If you have a dental device to assist you while at rest, use it at all times for the first 24 hours. For patients using CPAP machines:  ? Use your CPAP machine during all periods of sleep as usual.  ? Use your CPAP machine during all periods of daytime rest while on pain medicines. ** Follow up with your primary care doctor for continued care. IF YOU DO NOT TAKE ALL OF YOUR NARCOTIC PAIN MEDICATION, please dispose of them responsibly. There are drop off boxes in the Emergency Departments 24/7 at both [facility] and [place]. If these locations are not convenient, other options for discarding them can be found at:  [email or web address]    Hospital or office staff may NOT accept any medications to drop off in the cabinet for you.

## 2022-12-30 NOTE — H&P
Update History & Physical    The patient's History and Physical of was reviewed with the patient and I examined the patient. There was no change. The surgical site was confirmed by the patient and me. Plan: The risks, benefits, expected outcome, and alternative to the recommended procedure have been discussed with the patient. Patient understands and wants to proceed with the procedure.      Electronically signed by Radha Tyler MD on 12/30/2022 at 9:10 AM

## 2022-12-31 ENCOUNTER — TELEPHONE (OUTPATIENT)
Dept: ORTHOPEDIC SURGERY | Age: 52
End: 2022-12-31

## 2022-12-31 DIAGNOSIS — M84.469G INSUFFICIENCY FRACTURE OF TIBIA WITH DELAYED HEALING, SUBSEQUENT ENCOUNTER: Primary | ICD-10-CM

## 2022-12-31 RX ORDER — HYDROCODONE BITARTRATE AND ACETAMINOPHEN 5; 325 MG/1; MG/1
1 TABLET ORAL EVERY 6 HOURS PRN
Qty: 28 TABLET | Refills: 0 | Status: SHIPPED | OUTPATIENT
Start: 2022-12-31 | End: 2023-01-07

## 2022-12-31 NOTE — TELEPHONE ENCOUNTER
Received call from Lucille Bonilla regarding reaction to pain medication. She indicates that she is having a lot of itching including her arms and scalp. She has tolerated Norco in the past.  She was prescribed oxycodone and feels this is not going well for her. I agreed to send a repeat prescription for [place] to her pharmacy. I also recommended she take Benadryl.

## 2023-01-03 ENCOUNTER — TELEPHONE (OUTPATIENT)
Dept: ORTHOPEDIC SURGERY | Age: 53
End: 2023-01-03

## 2023-01-03 NOTE — TELEPHONE ENCOUNTER
General Question     Subject: PATIENT REQUESTING A CALL ABOUT SX.   Patient: Sailaja De La Cruz  Contact Number: [phone]

## 2023-01-18 ENCOUNTER — TELEPHONE (OUTPATIENT)
Dept: ORTHOPEDIC SURGERY | Age: 53
End: 2023-01-18

## 2023-01-18 NOTE — TELEPHONE ENCOUNTER
Appointment Request     Patient requesting earlier appointment: Yes  Appointment offered to patient: N/A  SWELLING ABOVE THE KNEE.   Patient Contact Number: [phone]

## 2023-01-24 ENCOUNTER — OFFICE VISIT (OUTPATIENT)
Dept: ORTHOPEDIC SURGERY | Age: 53
End: 2023-01-24

## 2023-01-24 VITALS — HEIGHT: 64 IN | BODY MASS INDEX: 50.02 KG/M2 | WEIGHT: 293 LBS

## 2023-01-24 DIAGNOSIS — M84.469G INSUFFICIENCY FRACTURE OF TIBIA WITH DELAYED HEALING, SUBSEQUENT ENCOUNTER: Primary | ICD-10-CM

## 2023-01-24 PROCEDURE — 99024 POSTOP FOLLOW-UP VISIT: CPT | Performed by: ORTHOPAEDIC SURGERY

## 2023-01-24 RX ORDER — OXYCODONE AND ACETAMINOPHEN 10; 325 MG/1; MG/1
1 TABLET ORAL EVERY 4 HOURS PRN
Qty: 42 TABLET | Refills: 0 | Status: SHIPPED | OUTPATIENT
Start: 2023-01-24 | End: 2023-01-31

## 2023-01-24 RX ORDER — METFORMIN HYDROCHLORIDE 500 MG/1
TABLET, EXTENDED RELEASE ORAL
COMMUNITY
Start: 2022-12-28

## 2023-01-24 NOTE — PROGRESS NOTES
She returns postop from a right knee grade arthroscopy partial meniscectomy and subchondral plasty medial tibial plateau. She doing fairly well. The pain is moderate but tolerable. She is happy with it having been done and is back to work. Her stitches were removed and she should follow-up in 4 weeks. I will give her a refill of her pain medication.

## 2023-03-09 ENCOUNTER — TELEPHONE (OUTPATIENT)
Dept: ORTHOPEDIC SURGERY | Age: 53
End: 2023-03-09

## 2023-03-10 NOTE — TELEPHONE ENCOUNTER
R/C TO PATIENT. TOLD HER WE NEEDED SPECIFIC INFORMATION ABOUT THE FALL SUCH AS DATE, APPROX TIME IT OCCURRED, LOCATION OF FALL AND DETAILS OF WHAT EXACTLY HAPPENED TO CAUSE THE FALL. PATIENT STATES SHE WILL COLLECT THIS INFORMATION AND EMAIL ME SOME TIME NEXT WEEK. EMAIL ADDRESS WAS PROVIDED TO PATIENT. PATIENT VERBALIZED UNDERSTANDING OF ALL THIS.

## 2023-06-06 ENCOUNTER — OFFICE VISIT (OUTPATIENT)
Dept: ORTHOPEDIC SURGERY | Age: 53
End: 2023-06-06

## 2023-06-06 VITALS — BODY MASS INDEX: 50.02 KG/M2 | WEIGHT: 293 LBS | HEIGHT: 64 IN

## 2023-06-06 DIAGNOSIS — M84.469G INSUFFICIENCY FRACTURE OF TIBIA WITH DELAYED HEALING, SUBSEQUENT ENCOUNTER: Primary | ICD-10-CM

## 2023-06-06 DIAGNOSIS — M17.11 PRIMARY OSTEOARTHRITIS OF RIGHT KNEE: ICD-10-CM

## 2023-06-06 RX ORDER — TRIAMCINOLONE ACETONIDE 40 MG/ML
40 INJECTION, SUSPENSION INTRA-ARTICULAR; INTRAMUSCULAR ONCE
Status: COMPLETED | OUTPATIENT
Start: 2023-06-06 | End: 2023-06-06

## 2023-06-06 RX ORDER — BUPIVACAINE HYDROCHLORIDE 2.5 MG/ML
7 INJECTION, SOLUTION INFILTRATION; PERINEURAL ONCE
Status: COMPLETED | OUTPATIENT
Start: 2023-06-06 | End: 2023-06-06

## 2023-06-06 RX ADMIN — TRIAMCINOLONE ACETONIDE 40 MG: 40 INJECTION, SUSPENSION INTRA-ARTICULAR; INTRAMUSCULAR at 15:02

## 2023-06-06 RX ADMIN — BUPIVACAINE HYDROCHLORIDE 17.5 MG: 2.5 INJECTION, SOLUTION INFILTRATION; PERINEURAL at 15:03

## 2023-06-06 NOTE — PROGRESS NOTES
She returns today for evaluation still is having pain in the knee that she grades at x6/10. She can walk and only has difficulty with steps and twisting her leg. At this time she points to pain along the anteromedial joint line where most your arthritis is. X-rays 3 views of her right knee demonstrate the subchondroplasty effect with severe arthritis in the medial compartment of her knee. Impression: Right knee osteoarthritis severe    Recommendation: Cortisone injection right knee that was placed at the point of maximal pain on the anteromedial joint line. Recommendation is for a cortisone injection into the right knee. After informed consent was received from the patient, the right knee was injected with 1 mL of 40mg/ml of Kenalog  and 4 mL of 0.25% Marcaine  in the syringe from an anteromedial l joint line approach, using a 25-gauge needle, under sterile Betadine prep, using ethyl chloride as a topical refrigerant, for a diagnosis of osteoarthritis. The patient appeared to tolerate it well. The patient should return here periodically as needed. Encounter Diagnoses   Name Primary?     Insufficiency fracture of tibia with delayed healing, subsequent encounter Yes    Primary osteoarthritis of right knee         Orders Placed This Encounter   Procedures    XR KNEE RIGHT (3 VIEWS)     Standing Status:   Future     Number of Occurrences:   1     Standing Expiration Date:   6/6/2024    82691 - [place] DRAIN/INJECT LARGE JOINT/BURSA

## 2023-09-19 ENCOUNTER — OFFICE VISIT (OUTPATIENT)
Dept: ORTHOPEDIC SURGERY | Age: 53
End: 2023-09-19

## 2023-09-19 VITALS — BODY MASS INDEX: 50.02 KG/M2 | HEIGHT: 64 IN | WEIGHT: 293 LBS

## 2023-09-19 DIAGNOSIS — M25.561 ACUTE BILATERAL KNEE PAIN: ICD-10-CM

## 2023-09-19 DIAGNOSIS — M25.562 ACUTE BILATERAL KNEE PAIN: ICD-10-CM

## 2023-09-19 DIAGNOSIS — M17.0 PRIMARY OSTEOARTHRITIS OF BOTH KNEES: Primary | ICD-10-CM

## 2023-09-19 RX ORDER — PREDNISONE 5 MG/1
TABLET ORAL
Qty: 55 TABLET | Refills: 0 | Status: SHIPPED | OUTPATIENT
Start: 2023-09-19

## 2023-09-19 RX ORDER — TRIAMCINOLONE ACETONIDE 40 MG/ML
80 INJECTION, SUSPENSION INTRA-ARTICULAR; INTRAMUSCULAR ONCE
Status: COMPLETED | OUTPATIENT
Start: 2023-09-19 | End: 2023-09-19

## 2023-09-19 RX ORDER — BUPIVACAINE HYDROCHLORIDE 2.5 MG/ML
14 INJECTION, SOLUTION INFILTRATION; PERINEURAL ONCE
Status: COMPLETED | OUTPATIENT
Start: 2023-09-19 | End: 2023-09-19

## 2023-09-19 RX ORDER — PREDNISONE 20 MG/1
20 TABLET ORAL DAILY
COMMUNITY

## 2023-09-19 RX ORDER — OXYCODONE HYDROCHLORIDE AND ACETAMINOPHEN 5; 325 MG/1; MG/1
1 TABLET ORAL EVERY 6 HOURS PRN
Qty: 28 TABLET | Refills: 0 | Status: SHIPPED | OUTPATIENT
Start: 2023-09-19 | End: 2023-09-26

## 2023-09-19 RX ADMIN — TRIAMCINOLONE ACETONIDE 80 MG: 40 INJECTION, SUSPENSION INTRA-ARTICULAR; INTRAMUSCULAR at 08:41

## 2023-09-19 RX ADMIN — BUPIVACAINE HYDROCHLORIDE 35 MG: 2.5 INJECTION, SOLUTION INFILTRATION; PERINEURAL at 08:40

## 2023-09-19 NOTE — PROGRESS NOTES
She presents today complaining of acute bilateral knee pain consistent with osteoarthritis. She is getting ready to go on some trips and would like cortisone injections today which worked well for her for extended periods of time. Recommendation is for a cortisone injection into the right knee. After informed consent was received from the patient, the right knee was injected with 1 mL of 40mg/ml of Kenalog  and 4 mL of 0.25% Marcaine  in the syringe from an anterolateral joint line approach, using a 25-gauge needle, under sterile Betadine prep, using ethyl chloride as a topical refrigerant, for a diagnosis of osteoarthritis. The patient appeared to tolerate it well. The patient should return here periodically as needed. No diagnosis found. No orders of the defined types were placed in this encounter. Recommendation is for a cortisone injection into the left knee. After informed consent was received from the patient, the left knee was injected with1 mL of 40mg/ml of Kenalog and 4 mL  of 0.25% Marcaine in the syringe from an anterolateral joint line approach, using a 25-gauge needle, under sterile Betadine prep, using ethyl chloride as a topical refrigerant, for a diagnosis of osteoarthritis. The patient appeared to tolerate it well. The patient should return here periodically as needed. No diagnosis found. No orders of the defined types were placed in this encounter. I will give her prescription for some Percocet for the acute pain to use sparingly and she should take with her on her trip in case she has pain that she can use at night. I told her not to drink or drive while taking it. Additionally I will give her a prednisone taper that she can use or begin on her trip if needed.

## 2023-11-28 DIAGNOSIS — M17.0 PRIMARY OSTEOARTHRITIS OF BOTH KNEES: ICD-10-CM

## 2023-11-29 RX ORDER — PREDNISONE 5 MG/1
TABLET ORAL
Qty: 55 TABLET | Refills: 0 | Status: SHIPPED | OUTPATIENT
Start: 2023-11-29

## 2024-01-23 ENCOUNTER — OFFICE VISIT (OUTPATIENT)
Dept: ORTHOPEDIC SURGERY | Age: 54
End: 2024-01-23

## 2024-01-23 VITALS — WEIGHT: 293 LBS | BODY MASS INDEX: 48.82 KG/M2 | HEIGHT: 65 IN

## 2024-01-23 DIAGNOSIS — M17.0 PRIMARY OSTEOARTHRITIS OF BOTH KNEES: Primary | ICD-10-CM

## 2024-01-23 RX ORDER — HYDROCODONE BITARTRATE AND ACETAMINOPHEN 5; 325 MG/1; MG/1
1 TABLET ORAL EVERY 6 HOURS PRN
Qty: 28 TABLET | Refills: 0 | Status: SHIPPED | OUTPATIENT
Start: 2024-01-23 | End: 2024-01-30

## 2024-01-23 RX ORDER — PREDNISONE 5 MG/1
TABLET ORAL
Qty: 55 TABLET | Refills: 0 | Status: SHIPPED | OUTPATIENT
Start: 2024-01-23

## 2024-01-23 NOTE — PROGRESS NOTES
Recommendation is for a cortisone injection into the right knee. After informed consent was received from the patient, the right knee was injected with 1 mL of 40mg/ml of Kenalog  and 4 mL of 0.25% Marcaine  in the syringe from an anterolateral joint line approach, using a 25-gauge needle, under sterile Betadine prep, using ethyl chloride as a topical refrigerant, for a diagnosis of osteoarthritis.   The patient appeared to tolerate it well.   The patient should return here periodically as needed.    No diagnosis found.     No orders of the defined types were placed in this encounter.       Recommendation is for a cortisone injection into the left knee. After informed consent was received from the patient, the left knee was injected with1 mL of 40mg/ml of Kenalog and 4 mL  of 0.25% Marcaine in the syringe from an anterolateral joint line approach, using a 25-gauge needle, under sterile Betadine prep, using ethyl chloride as a topical refrigerant, for a diagnosis of osteoarthritis. The patient appeared to tolerate it well.   The patient should return here periodically as needed.    No diagnosis found.     No orders of the defined types were placed in this encounter.

## 2024-04-01 ENCOUNTER — TELEPHONE (OUTPATIENT)
Dept: ORTHOPEDIC SURGERY | Age: 54
End: 2024-04-01

## 2024-04-01 DIAGNOSIS — M17.0 PRIMARY OSTEOARTHRITIS OF BOTH KNEES: ICD-10-CM

## 2024-04-01 NOTE — TELEPHONE ENCOUNTER
Prescription Refill     Medication Name:  PREDNISONE AND PAIN MED  Pharmacy: [facility] [place]  Patient Contact Number:  [phone]

## 2024-04-03 RX ORDER — HYDROCODONE BITARTRATE AND ACETAMINOPHEN 5; 325 MG/1; MG/1
1 TABLET ORAL EVERY 6 HOURS PRN
Qty: 28 TABLET | Refills: 0 | OUTPATIENT
Start: 2024-04-03 | End: 2024-04-10

## 2024-04-03 RX ORDER — PREDNISONE 5 MG/1
TABLET ORAL
Qty: 55 TABLET | Refills: 0 | Status: SHIPPED | OUTPATIENT
Start: 2024-04-03

## 2024-07-11 ENCOUNTER — OFFICE VISIT (OUTPATIENT)
Dept: ORTHOPEDIC SURGERY | Age: 54
End: 2024-07-11

## 2024-07-11 VITALS — WEIGHT: 293 LBS | BODY MASS INDEX: 48.82 KG/M2 | HEIGHT: 65 IN

## 2024-07-11 DIAGNOSIS — M17.0 PRIMARY OSTEOARTHRITIS OF BOTH KNEES: Primary | ICD-10-CM

## 2024-07-11 RX ORDER — BUPIVACAINE HYDROCHLORIDE 2.5 MG/ML
14 INJECTION, SOLUTION INFILTRATION; PERINEURAL ONCE
Status: COMPLETED | OUTPATIENT
Start: 2024-07-11 | End: 2024-07-11

## 2024-07-11 RX ORDER — PREDNISONE 5 MG/1
5 TABLET ORAL SEE ADMIN INSTRUCTIONS
Qty: 55 TABLET | Refills: 1 | Status: SHIPPED | OUTPATIENT
Start: 2024-07-11 | End: 2024-07-21

## 2024-07-11 RX ORDER — TRAMADOL HYDROCHLORIDE 50 MG/1
50 TABLET ORAL EVERY 6 HOURS PRN
Qty: 28 TABLET | Refills: 0 | Status: SHIPPED | OUTPATIENT
Start: 2024-07-11 | End: 2024-07-18

## 2024-07-11 RX ORDER — TRIAMCINOLONE ACETONIDE 40 MG/ML
80 INJECTION, SUSPENSION INTRA-ARTICULAR; INTRAMUSCULAR ONCE
Status: COMPLETED | OUTPATIENT
Start: 2024-07-11 | End: 2024-07-11

## 2024-07-11 RX ADMIN — TRIAMCINOLONE ACETONIDE 80 MG: 40 INJECTION, SUSPENSION INTRA-ARTICULAR; INTRAMUSCULAR at 13:31

## 2024-07-11 RX ADMIN — BUPIVACAINE HYDROCHLORIDE 35 MG: 2.5 INJECTION, SOLUTION INFILTRATION; PERINEURAL at 13:32

## 2024-07-11 NOTE — PROGRESS NOTES
She returns to for evaluation.  Increased pain in her knee.  She was given cortisone back in January but again is getting ready to travel some over the summer and fall.  Today she is complaining some catching with range of motion when she puts her right leg in certain positions which is painful most of the anteromedial knee.  She otherwise has had no interval injuries and grades her pain 7/10 right worse than left.  She complains of clicking popping and feels like it is catching at times.  She has difficulty with walking and stairs.    She has no gross instability on exam and her neurocirculatory lymphatic exam is otherwise normal symmetric.  She does have some crepitus in the knee which is symptomatic in the anteromedial side.    X-rays 3 views of both knees were taken and she has severe arthritis medially right worse than left.  Is grade 4 in the right medial knee.    Impression: Osteoarthritis of both knees severe right worse than left    Recommendation: At this time we will give a cortisone in both knees today for immediate relief and give her some tramadol for the acute pain that she has as well as some prednisone that she can take on her trips for breakthrough pain.  I feel some point she will likely benefit from viscosupplementation and should probably return in a month or so to consider those.  I would likely use Durolane.    Recommendation is for a cortisone injection into the right knee. After informed consent was received from the patient, the right knee was injected with 1 mL of 40mg/ml of Kenalog  and 4 mL of 0.25% Marcaine  in the syringe from an anterolateral joint line approach, using a 25-gauge needle, under sterile Betadine prep, using ethyl chloride as a topical refrigerant, for a diagnosis of osteoarthritis.   The patient appeared to tolerate it well.   The patient should return here periodically as needed.    Encounter Diagnosis   Name Primary?    Primary osteoarthritis of both knees Yes

## 2024-08-28 ENCOUNTER — TELEPHONE (OUTPATIENT)
Dept: ORTHOPEDIC SURGERY | Age: 54
End: 2024-08-28

## 2024-08-28 NOTE — TELEPHONE ENCOUNTER
Other PATIENT IS CHECKING THE STATUS OF GEL INJECTION APPROVAL. [phone]     Laser Type: Fraxel Re:pair

## 2024-09-13 ENCOUNTER — OFFICE VISIT (OUTPATIENT)
Age: 54
End: 2024-09-13

## 2024-09-13 VITALS — BODY MASS INDEX: 48.82 KG/M2 | HEIGHT: 65 IN | WEIGHT: 293 LBS

## 2024-09-13 DIAGNOSIS — M25.562 ACUTE BILATERAL KNEE PAIN: ICD-10-CM

## 2024-09-13 DIAGNOSIS — M25.561 ACUTE BILATERAL KNEE PAIN: ICD-10-CM

## 2024-09-13 DIAGNOSIS — M17.0 PRIMARY OSTEOARTHRITIS OF BOTH KNEES: Primary | ICD-10-CM

## 2024-09-13 RX ORDER — PREDNISONE 5 MG/1
5 TABLET ORAL DAILY
Qty: 60 TABLET | Refills: 0 | Status: SHIPPED | OUTPATIENT
Start: 2024-09-13 | End: 2024-11-12

## 2024-09-13 RX ORDER — TRIAMCINOLONE ACETONIDE 40 MG/ML
80 INJECTION, SUSPENSION INTRA-ARTICULAR; INTRAMUSCULAR ONCE
Status: COMPLETED | OUTPATIENT
Start: 2024-09-13 | End: 2024-09-13

## 2024-09-13 RX ORDER — HYDROCODONE BITARTRATE AND ACETAMINOPHEN 5; 325 MG/1; MG/1
1 TABLET ORAL EVERY 6 HOURS PRN
Qty: 28 TABLET | Refills: 0 | Status: SHIPPED | OUTPATIENT
Start: 2024-09-13 | End: 2024-09-20

## 2024-09-13 RX ORDER — BUPIVACAINE HYDROCHLORIDE 2.5 MG/ML
14 INJECTION, SOLUTION INFILTRATION; PERINEURAL ONCE
Status: COMPLETED | OUTPATIENT
Start: 2024-09-13 | End: 2024-09-13

## 2024-09-13 RX ADMIN — BUPIVACAINE HYDROCHLORIDE 35 MG: 2.5 INJECTION, SOLUTION INFILTRATION; PERINEURAL at 10:22

## 2024-09-13 RX ADMIN — TRIAMCINOLONE ACETONIDE 80 MG: 40 INJECTION, SUSPENSION INTRA-ARTICULAR; INTRAMUSCULAR at 10:21

## 2024-10-07 ENCOUNTER — TELEPHONE (OUTPATIENT)
Dept: ORTHOPEDIC SURGERY | Age: 54
End: 2024-10-07

## 2024-10-07 NOTE — TELEPHONE ENCOUNTER
Other PATIENT IS CALLING IN WANTING TO KNOW IF SHE IS ALLOWED ANOTHER SET OF INJECTIONS. IF SHE CAN NOT HAVE INJECTIONS WOULD LIKE TO KNOW IF SHE CAN TAKE PREDNISONE. SHE IS GOING OUT OF TOWN IN TWO DAYS. [phone]

## 2024-10-28 DIAGNOSIS — M17.0 PRIMARY OSTEOARTHRITIS OF BOTH KNEES: ICD-10-CM

## 2024-10-28 NOTE — TELEPHONE ENCOUNTER
Prescription Refill     Medication Name:  PREDNISONE 5 MG AND HYDROCODONE   Pharmacy: [facility]   Patient Contact Number:  [phone]

## 2024-10-29 RX ORDER — METHYLPREDNISOLONE 4 MG/1
TABLET ORAL
Qty: 1 KIT | Refills: 0 | Status: SHIPPED | OUTPATIENT
Start: 2024-10-29

## 2024-10-29 RX ORDER — HYDROCODONE BITARTRATE AND ACETAMINOPHEN 5; 325 MG/1; MG/1
1 TABLET ORAL EVERY 6 HOURS PRN
Qty: 28 TABLET | Refills: 0 | Status: SHIPPED | OUTPATIENT
Start: 2024-10-29 | End: 2024-11-05

## 2024-11-13 ENCOUNTER — OFFICE VISIT (OUTPATIENT)
Dept: ORTHOPEDIC SURGERY | Age: 54
End: 2024-11-13

## 2024-11-13 VITALS — HEIGHT: 65 IN | BODY MASS INDEX: 48.82 KG/M2 | WEIGHT: 293 LBS

## 2024-11-13 DIAGNOSIS — M71.20 SYNOVIAL CYST OF POPLITEAL SPACE, UNSPECIFIED LATERALITY: ICD-10-CM

## 2024-11-13 DIAGNOSIS — M17.0 PRIMARY OSTEOARTHRITIS OF BOTH KNEES: Primary | ICD-10-CM

## 2024-11-13 RX ORDER — PREDNISONE 5 MG/1
5 TABLET ORAL 2 TIMES DAILY
Qty: 60 TABLET | Refills: 0 | Status: SHIPPED | OUTPATIENT
Start: 2024-11-13 | End: 2024-12-13

## 2024-11-13 RX ORDER — TRIAMCINOLONE ACETONIDE 40 MG/ML
80 INJECTION, SUSPENSION INTRA-ARTICULAR; INTRAMUSCULAR ONCE
Status: COMPLETED | OUTPATIENT
Start: 2024-11-13 | End: 2024-11-13

## 2024-11-13 RX ORDER — BUPIVACAINE HYDROCHLORIDE 2.5 MG/ML
14 INJECTION, SOLUTION INFILTRATION; PERINEURAL ONCE
Status: COMPLETED | OUTPATIENT
Start: 2024-11-13 | End: 2024-11-13

## 2024-11-13 RX ORDER — HYDROCODONE BITARTRATE AND ACETAMINOPHEN 5; 325 MG/1; MG/1
1 TABLET ORAL EVERY 6 HOURS PRN
Qty: 28 TABLET | Refills: 0 | Status: SHIPPED | OUTPATIENT
Start: 2024-11-13 | End: 2024-11-20

## 2024-11-13 RX ADMIN — TRIAMCINOLONE ACETONIDE 80 MG: 40 INJECTION, SUSPENSION INTRA-ARTICULAR; INTRAMUSCULAR at 15:48

## 2024-11-13 RX ADMIN — BUPIVACAINE HYDROCHLORIDE 35 MG: 2.5 INJECTION, SOLUTION INFILTRATION; PERINEURAL at 15:49

## 2024-11-13 NOTE — PROGRESS NOTES
as a topical refrigerant, for a diagnosis of osteoarthritis. The patient appeared to tolerate it well.   The patient should return here periodically as needed.    Encounter Diagnosis   Name Primary?    Primary osteoarthritis of both knees Yes        Orders Placed This Encounter   Procedures    DRAIN/INJECT LARGE JOINT/BURSA

## 2025-02-12 ENCOUNTER — OFFICE VISIT (OUTPATIENT)
Dept: ORTHOPEDIC SURGERY | Age: 55
End: 2025-02-12

## 2025-02-12 VITALS — HEIGHT: 65 IN | BODY MASS INDEX: 48.82 KG/M2 | WEIGHT: 293 LBS

## 2025-02-12 DIAGNOSIS — M17.11 PRIMARY OSTEOARTHRITIS OF RIGHT KNEE: Primary | ICD-10-CM

## 2025-02-12 RX ORDER — BUPIVACAINE HYDROCHLORIDE 2.5 MG/ML
7 INJECTION, SOLUTION INFILTRATION; PERINEURAL ONCE
Status: COMPLETED | OUTPATIENT
Start: 2025-02-12 | End: 2025-02-12

## 2025-02-12 RX ORDER — TRIAMCINOLONE ACETONIDE 40 MG/ML
40 INJECTION, SUSPENSION INTRA-ARTICULAR; INTRAMUSCULAR ONCE
Status: COMPLETED | OUTPATIENT
Start: 2025-02-12 | End: 2025-02-12

## 2025-02-12 RX ORDER — HYDROCODONE BITARTRATE AND ACETAMINOPHEN 5; 325 MG/1; MG/1
1 TABLET ORAL EVERY 6 HOURS PRN
Qty: 28 TABLET | Refills: 0 | Status: SHIPPED | OUTPATIENT
Start: 2025-02-12 | End: 2025-02-19

## 2025-02-12 RX ADMIN — TRIAMCINOLONE ACETONIDE 40 MG: 40 INJECTION, SUSPENSION INTRA-ARTICULAR; INTRAMUSCULAR at 11:30

## 2025-02-12 RX ADMIN — BUPIVACAINE HYDROCHLORIDE 17.5 MG: 2.5 INJECTION, SOLUTION INFILTRATION; PERINEURAL at 11:29

## 2025-02-12 NOTE — PROGRESS NOTES
She returns today for evaluation.  She apparently has been in the hospital up until recently.  She had a severe cellulitis to her left leg was quite swollen and she was in the hospital 10 days.  She was septic and probably in septic shock.  This all resolved at this point her leg is doing much.  She does walk without amatory aids but she still some erythema on the left lower leg.  She does not have anything on the right leg per se.  At this time she complains of right knee pain mostly in the medial side and in review she did have viscosupplementation on September 13 so she would not be able to get it again until the middle of March.  At this time because of pain in her right knee from osteoarthritis I did recommend a cortisone injection to her right knee and have her follow-up in a month or so to be considered for viscosupplementation.  She is comfortable with those recommendations we will proceed accordingly

## 2025-03-12 DIAGNOSIS — M17.0 PRIMARY OSTEOARTHRITIS OF BOTH KNEES: Primary | ICD-10-CM

## 2025-03-20 ENCOUNTER — OFFICE VISIT (OUTPATIENT)
Dept: ORTHOPEDIC SURGERY | Age: 55
End: 2025-03-20

## 2025-03-20 VITALS — HEIGHT: 65 IN | WEIGHT: 293 LBS | BODY MASS INDEX: 48.82 KG/M2

## 2025-03-20 DIAGNOSIS — M17.0 PRIMARY OSTEOARTHRITIS OF BOTH KNEES: Primary | ICD-10-CM

## 2025-03-20 RX ORDER — TRIAMCINOLONE ACETONIDE 40 MG/ML
80 INJECTION, SUSPENSION INTRA-ARTICULAR; INTRAMUSCULAR ONCE
Status: COMPLETED | OUTPATIENT
Start: 2025-03-20 | End: 2025-03-20

## 2025-03-20 RX ORDER — BUPIVACAINE HYDROCHLORIDE 2.5 MG/ML
14 INJECTION, SOLUTION INFILTRATION; PERINEURAL ONCE
Status: COMPLETED | OUTPATIENT
Start: 2025-03-20 | End: 2025-03-20

## 2025-03-20 RX ORDER — TRAMADOL HYDROCHLORIDE 50 MG/1
50 TABLET ORAL EVERY 6 HOURS PRN
Qty: 28 TABLET | Refills: 0 | Status: SHIPPED | OUTPATIENT
Start: 2025-03-20 | End: 2025-03-27

## 2025-03-20 RX ADMIN — TRIAMCINOLONE ACETONIDE 80 MG: 40 INJECTION, SUSPENSION INTRA-ARTICULAR; INTRAMUSCULAR at 14:20

## 2025-03-20 RX ADMIN — BUPIVACAINE HYDROCHLORIDE 35 MG: 2.5 INJECTION, SOLUTION INFILTRATION; PERINEURAL at 14:19

## 2025-03-20 NOTE — PROGRESS NOTES
needed.    Encounter Diagnosis   Name Primary?    Primary osteoarthritis of both knees Yes        Orders Placed This Encounter   Procedures    DRAIN/INJECT LARGE JOINT/BURSA

## 2025-03-31 ENCOUNTER — TELEPHONE (OUTPATIENT)
Dept: ORTHOPEDIC SURGERY | Age: 55
End: 2025-03-31

## 2025-03-31 NOTE — TELEPHONE ENCOUNTER
General Question     Subject: REQUESTING A CALL REGARDING HER DUROLANE.  Patient and /or Facility Request: Saritha Stubbs   Contact Number: [phone]

## 2025-04-28 DIAGNOSIS — M17.11 PRIMARY OSTEOARTHRITIS OF RIGHT KNEE: ICD-10-CM

## 2025-04-28 RX ORDER — HYDROCODONE BITARTRATE AND ACETAMINOPHEN 5; 325 MG/1; MG/1
1 TABLET ORAL EVERY 6 HOURS PRN
Qty: 28 TABLET | Refills: 0 | Status: SHIPPED | OUTPATIENT
Start: 2025-04-28 | End: 2025-05-05

## 2025-04-29 ENCOUNTER — OFFICE VISIT (OUTPATIENT)
Dept: ORTHOPEDIC SURGERY | Age: 55
End: 2025-04-29

## 2025-04-29 VITALS — HEIGHT: 65 IN | BODY MASS INDEX: 48.82 KG/M2 | WEIGHT: 293 LBS

## 2025-04-29 DIAGNOSIS — M17.11 PRIMARY OSTEOARTHRITIS OF RIGHT KNEE: Primary | ICD-10-CM

## 2025-04-29 DIAGNOSIS — M17.12 PRIMARY OSTEOARTHRITIS OF LEFT KNEE: ICD-10-CM

## 2025-04-29 RX ORDER — BUPIVACAINE HYDROCHLORIDE 2.5 MG/ML
14 INJECTION, SOLUTION INFILTRATION; PERINEURAL ONCE
Status: COMPLETED | OUTPATIENT
Start: 2025-04-29 | End: 2025-04-29

## 2025-04-29 RX ORDER — TRIAMCINOLONE ACETONIDE 40 MG/ML
80 INJECTION, SUSPENSION INTRA-ARTICULAR; INTRAMUSCULAR ONCE
Status: COMPLETED | OUTPATIENT
Start: 2025-04-29 | End: 2025-04-29

## 2025-04-29 RX ADMIN — TRIAMCINOLONE ACETONIDE 80 MG: 40 INJECTION, SUSPENSION INTRA-ARTICULAR; INTRAMUSCULAR at 15:18

## 2025-04-29 RX ADMIN — BUPIVACAINE HYDROCHLORIDE 35 MG: 2.5 INJECTION, SOLUTION INFILTRATION; PERINEURAL at 15:18

## 2025-04-29 NOTE — PROGRESS NOTES
HISTORY OF PRESENT ILLNESS: Patient returns today for the first of a series of three Euflexxa injections done to the right knee that was performed under a sterile Betadine prep, using ethyl chloride as a topical refrigerant, for a diagnosis of osteoarthritis. The injection of 2 ml of Euflexxa was done from an anterolateral joint line approach using a 25-gauge needle. It was done without incident and the patient tolerated it well.  PLAN: The patient should return next week to obtain their second out of a series of three injections of Euflexxa.    Patient supplied euflexxa    Recommendation is for a cortisone injection into the right knee. After informed consent was received from the patient, the right knee was injected with 1 mL of 40mg/ml of Kenalog  and 4 mL of 0.25% Marcaine  in the syringe from an anterolateral joint line approach, using a 25-gauge needle, under sterile Betadine prep, using ethyl chloride as a topical refrigerant, for a diagnosis of osteoarthritis.   The patient appeared to tolerate it well.   The patient should return here periodically as needed.    Encounter Diagnosis   Name Primary?    Primary osteoarthritis of right knee Yes        No orders of the defined types were placed in this encounter.       Recommendation is for a cortisone injection into the left knee. After informed consent was received from the patient, the left knee was injected with1 mL of 40mg/ml of Kenalog and 4 mL  of 0.25% Marcaine in the syringe from an anterolateral joint line approach, using a 25-gauge needle, under sterile Betadine prep, using ethyl chloride as a topical refrigerant, for a diagnosis of osteoarthritis. The patient appeared to tolerate it well.   The patient should return here periodically as needed.    Encounter Diagnosis   Name Primary?    Primary osteoarthritis of right knee Yes        No orders of the defined types were placed in this encounter.

## 2025-05-06 ENCOUNTER — OFFICE VISIT (OUTPATIENT)
Dept: ORTHOPEDIC SURGERY | Age: 55
End: 2025-05-06
Payer: COMMERCIAL

## 2025-05-06 VITALS — BODY MASS INDEX: 48.82 KG/M2 | HEIGHT: 65 IN | WEIGHT: 293 LBS

## 2025-05-06 DIAGNOSIS — M17.0 PRIMARY OSTEOARTHRITIS OF BOTH KNEES: Primary | ICD-10-CM

## 2025-05-06 PROCEDURE — 20610 DRAIN/INJ JOINT/BURSA W/O US: CPT | Performed by: ORTHOPAEDIC SURGERY

## 2025-05-07 ENCOUNTER — TELEPHONE (OUTPATIENT)
Dept: ENDOCRINOLOGY | Age: 55
End: 2025-05-07

## 2025-05-07 NOTE — TELEPHONE ENCOUNTER
Fax from [facility] sending referral to see Dr. Arias    Dx- Morbid obesity, we do not treat for this dx    Referring - Jacquelin Rodrigues CNP

## 2025-05-14 ENCOUNTER — OFFICE VISIT (OUTPATIENT)
Dept: ORTHOPEDIC SURGERY | Age: 55
End: 2025-05-14
Payer: COMMERCIAL

## 2025-05-14 VITALS — WEIGHT: 293 LBS | HEIGHT: 65 IN | BODY MASS INDEX: 48.82 KG/M2

## 2025-05-14 DIAGNOSIS — M17.11 PRIMARY OSTEOARTHRITIS OF RIGHT KNEE: Primary | ICD-10-CM

## 2025-05-14 PROCEDURE — 20610 DRAIN/INJ JOINT/BURSA W/O US: CPT | Performed by: ORTHOPAEDIC SURGERY

## 2025-05-14 RX ORDER — BUPIVACAINE HYDROCHLORIDE 2.5 MG/ML
7 INJECTION, SOLUTION INFILTRATION; PERINEURAL ONCE
Status: COMPLETED | OUTPATIENT
Start: 2025-05-14 | End: 2025-05-14

## 2025-05-14 RX ORDER — TRIAMCINOLONE ACETONIDE 40 MG/ML
40 INJECTION, SUSPENSION INTRA-ARTICULAR; INTRAMUSCULAR ONCE
Status: COMPLETED | OUTPATIENT
Start: 2025-05-14 | End: 2025-05-14

## 2025-05-14 RX ADMIN — TRIAMCINOLONE ACETONIDE 40 MG: 40 INJECTION, SUSPENSION INTRA-ARTICULAR; INTRAMUSCULAR at 14:43

## 2025-05-14 RX ADMIN — BUPIVACAINE HYDROCHLORIDE 17.5 MG: 2.5 INJECTION, SOLUTION INFILTRATION; PERINEURAL at 14:49

## 2025-05-14 NOTE — PROGRESS NOTES
HISTORY OF PRESENT ILLNESS: Patient returns today for their third out of a series of three Euflexxa injections done to the right knee that was performed under a sterile Betadine prep, using ethyl chloride as topical refrigerant, for a diagnosis of osteoarthritis. The injection of 2 ml of Euflexxa was done from an anterolateral joint line approach using a 25-gauge needle. It was done without incident and was tolerated well.  PLAN: The patient should return in two months for followup if needed.    No diagnosis found.   No orders of the defined types were placed in this encounter.     Recommendation is for a cortisone injection into the right knee. After informed consent was received from the patient, the right knee was injected with 1 mL of 40mg/ml of Kenalog  and 4 mL of 0.25% Marcaine  in the syringe from an anterolateral joint line approach, using a 25-gauge needle, under sterile Betadine prep, using ethyl chloride as a topical refrigerant, for a diagnosis of osteoarthritis.   The patient appeared to tolerate it well.   The patient should return here periodically as needed.    Patient supplied the Euflexxa

## 2025-07-01 ENCOUNTER — HOSPITAL ENCOUNTER (OUTPATIENT)
Dept: MAMMOGRAPHY | Age: 55
Discharge: HOME | End: 2025-07-01
Payer: COMMERCIAL

## 2025-07-01 DIAGNOSIS — Z12.39 SCREENING BREAST EXAMINATION: ICD-10-CM

## 2025-07-01 PROCEDURE — 77063 BREAST TOMOSYNTHESIS BI: CPT

## 2025-07-09 ENCOUNTER — TELEPHONE (OUTPATIENT)
Dept: ORTHOPEDIC SURGERY | Age: 55
End: 2025-07-09

## 2025-07-09 NOTE — TELEPHONE ENCOUNTER
Prescription Refill      Medication Name: prednisone  [facility]   Patient Contact Number: [phone]    Please call patient to make aware once meds are sent

## 2025-07-10 NOTE — TELEPHONE ENCOUNTER
Spoke with patient she is aware that Dr. Wilder will be out until Wed and is ok with waiting for medication, since she is in [place] until Mon

## 2025-07-14 DIAGNOSIS — M17.0 PRIMARY OSTEOARTHRITIS OF BOTH KNEES: ICD-10-CM

## 2025-07-15 ENCOUNTER — APPOINTMENT (OUTPATIENT)
Dept: GENERAL RADIOLOGY | Age: 55
End: 2025-07-15
Payer: COMMERCIAL

## 2025-07-15 ENCOUNTER — APPOINTMENT (OUTPATIENT)
Dept: CT IMAGING | Age: 55
End: 2025-07-15
Payer: COMMERCIAL

## 2025-07-15 ENCOUNTER — HOSPITAL ENCOUNTER (EMERGENCY)
Age: 55
Discharge: HOME | End: 2025-07-15
Payer: COMMERCIAL

## 2025-07-15 VITALS
SYSTOLIC BLOOD PRESSURE: 148 MMHG | BODY MASS INDEX: 57.04 KG/M2 | DIASTOLIC BLOOD PRESSURE: 86 MMHG | TEMPERATURE: 97.9 F | HEART RATE: 68 BPM | WEIGHT: 293 LBS | OXYGEN SATURATION: 100 % | RESPIRATION RATE: 16 BRPM

## 2025-07-15 DIAGNOSIS — S50.02XA CONTUSION OF LEFT ELBOW, INITIAL ENCOUNTER: ICD-10-CM

## 2025-07-15 DIAGNOSIS — M25.562 ACUTE PAIN OF BOTH KNEES: ICD-10-CM

## 2025-07-15 DIAGNOSIS — M25.561 ACUTE PAIN OF BOTH KNEES: ICD-10-CM

## 2025-07-15 DIAGNOSIS — S93.402A SPRAIN OF LEFT ANKLE, UNSPECIFIED LIGAMENT, INITIAL ENCOUNTER: ICD-10-CM
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
## 2025-07-15 PROCEDURE — 73562 X-RAY EXAM OF KNEE 3: CPT

## 2025-07-15 PROCEDURE — 73700 CT LOWER EXTREMITY W/O DYE: CPT

## 2025-07-15 PROCEDURE — 73080 X-RAY EXAM OF ELBOW: CPT

## 2025-07-15 PROCEDURE — 73610 X-RAY EXAM OF ANKLE: CPT

## 2025-07-15 PROCEDURE — 99284 EMERGENCY DEPT VISIT MOD MDM: CPT

## 2025-07-15 RX ORDER — PROGESTERONE 100 MG/1
100 CAPSULE ORAL NIGHTLY
COMMUNITY
Start: 2025-07-01

## 2025-07-15 RX ORDER — ESTRADIOL 0.05 MG/D
1 PATCH, EXTENDED RELEASE TRANSDERMAL
COMMUNITY
Start: 2025-07-01

## 2025-07-15 ASSESSMENT — PAIN SCALES - GENERAL: PAINLEVEL_OUTOF10: 4

## 2025-07-15 ASSESSMENT — PAIN - FUNCTIONAL ASSESSMENT: PAIN_FUNCTIONAL_ASSESSMENT: 0-10

## 2025-07-15 NOTE — ED PROVIDER NOTES
Normal.      Left shoulder: Normal.      Right elbow: Normal.      Left elbow: No swelling. Normal range of motion. Tenderness present in olecranon process.      Right forearm: Normal.      Left forearm: Normal.      Right wrist: Normal.      Left wrist: Normal.      Cervical back: Normal, normal range of motion and neck supple.      Thoracic back: Normal.      Lumbar back: Normal.      Right hip: Normal.      Left hip: Normal.      Right knee: No deformity, erythema or lacerations. Tenderness present. No patellar tendon tenderness.      Left knee: No deformity, erythema or lacerations. Tenderness present. No patellar tendon tenderness.      Left ankle: Tenderness present over the lateral malleolus. No medial malleolus or proximal fibula tenderness. Normal range of motion. Anterior drawer test negative. Normal pulse.      Left Achilles Tendon: Normal. No tenderness or defects. Hernandez's test negative.   Skin:     General: Skin is warm and dry.      Capillary Refill: Capillary refill takes less than 2 seconds.   Neurological:      General: No focal deficit present.      Mental Status: She is alert and oriented to person, place, and time.   Psychiatric:         Mood and Affect: Mood normal.         Behavior: Behavior normal.           DIAGNOSTIC RESULTS   LABS:    Labs Reviewed - No data to display    When ordered only abnormal lab results are displayed. All other labs were within normal range or not returned as of this dictation.    EKG: When ordered, EKG's are interpreted by the Emergency Department Physician in the absence of a cardiologist.  Please see their note for interpretation of EKG.    RADIOLOGY:   Non-plain film images such as CT, Ultrasound and MRI are read by the radiologist.      X-Ray Independently interpreted by me:     Interpretation per the Radiologist below, if available at the time of this note:    CT KNEE LEFT WO CONTRAST   Final Result   1. No acute osseous abnormality.   2. Severe medial

## 2025-07-16 RX ORDER — PREDNISONE 5 MG/1
TABLET ORAL
Qty: 55 TABLET | Refills: 0 | Status: SHIPPED | OUTPATIENT
Start: 2025-07-16

## 2025-08-07 ENCOUNTER — OFFICE VISIT (OUTPATIENT)
Dept: ORTHOPEDIC SURGERY | Age: 55
End: 2025-08-07

## 2025-08-07 VITALS — WEIGHT: 293 LBS | HEIGHT: 65 IN | BODY MASS INDEX: 48.82 KG/M2

## 2025-08-07 DIAGNOSIS — S80.02XA CONTUSION OF LEFT KNEE, INITIAL ENCOUNTER: ICD-10-CM

## 2025-08-07 DIAGNOSIS — M25.562 ACUTE BILATERAL KNEE PAIN: ICD-10-CM

## 2025-08-07 DIAGNOSIS — Z98.890 HISTORY OF ARTHROSCOPIC KNEE SURGERY: ICD-10-CM

## 2025-08-07 DIAGNOSIS — E66.01 MORBID OBESITY (HCC): ICD-10-CM

## 2025-08-07 DIAGNOSIS — S80.01XA CONTUSION OF RIGHT KNEE, INITIAL ENCOUNTER: ICD-10-CM

## 2025-08-07 DIAGNOSIS — M17.0 PRIMARY OSTEOARTHRITIS OF BOTH KNEES: Primary | ICD-10-CM

## 2025-08-07 DIAGNOSIS — M25.561 ACUTE BILATERAL KNEE PAIN: ICD-10-CM

## 2025-08-07 RX ORDER — PREDNISONE 5 MG/1
5 TABLET ORAL 2 TIMES DAILY
Qty: 60 TABLET | Refills: 1 | Status: SHIPPED | OUTPATIENT
Start: 2025-08-07 | End: 2025-10-06

## 2025-08-07 RX ORDER — HYDROCODONE BITARTRATE AND ACETAMINOPHEN 5; 325 MG/1; MG/1
1 TABLET ORAL EVERY 6 HOURS PRN
Qty: 48 TABLET | Refills: 0 | Status: SHIPPED | OUTPATIENT
Start: 2025-08-07 | End: 2025-08-19

## 2025-08-08 ENCOUNTER — TELEPHONE (OUTPATIENT)
Dept: ORTHOPEDIC SURGERY | Age: 55
End: 2025-08-08

## 2025-08-18 ENCOUNTER — TELEPHONE (OUTPATIENT)
Dept: ORTHOPEDIC SURGERY | Age: 55
End: 2025-08-18

## 2025-08-21 ENCOUNTER — OFFICE VISIT (OUTPATIENT)
Dept: ORTHOPEDIC SURGERY | Age: 55
End: 2025-08-21

## 2025-08-21 VITALS — BODY MASS INDEX: 48.82 KG/M2 | HEIGHT: 65 IN | WEIGHT: 293 LBS

## 2025-08-21 DIAGNOSIS — M70.22 OLECRANON BURSITIS OF LEFT ELBOW: ICD-10-CM

## 2025-08-21 DIAGNOSIS — M17.0 PRIMARY OSTEOARTHRITIS OF BOTH KNEES: ICD-10-CM

## 2025-08-21 DIAGNOSIS — S80.01XD CONTUSION OF RIGHT KNEE, SUBSEQUENT ENCOUNTER: ICD-10-CM

## 2025-08-21 DIAGNOSIS — S80.02XD CONTUSION OF LEFT KNEE, SUBSEQUENT ENCOUNTER: Primary | ICD-10-CM

## 2025-08-21 PROBLEM — M10.9 GOUTY BURSITIS OF LEFT OLECRANON: Status: ACTIVE | Noted: 2025-08-21

## 2025-08-21 RX ORDER — HYDROCODONE BITARTRATE AND ACETAMINOPHEN 5; 325 MG/1; MG/1
1 TABLET ORAL EVERY 6 HOURS PRN
Qty: 28 TABLET | Refills: 0 | Status: SHIPPED | OUTPATIENT
Start: 2025-08-21 | End: 2025-08-28

## 2025-08-25 ENCOUNTER — HOSPITAL ENCOUNTER (OUTPATIENT)
Dept: MRI IMAGING | Age: 55
Discharge: HOME | End: 2025-08-25
Attending: ORTHOPAEDIC SURGERY
Payer: COMMERCIAL

## 2025-08-25 DIAGNOSIS — S80.01XA CONTUSION OF RIGHT KNEE, INITIAL ENCOUNTER: ICD-10-CM

## 2025-08-25 PROCEDURE — 73721 MRI JNT OF LWR EXTRE W/O DYE: CPT

## 2025-08-27 ENCOUNTER — TELEPHONE (OUTPATIENT)
Dept: ORTHOPEDIC SURGERY | Age: 55
End: 2025-08-27

## 2025-09-04 ENCOUNTER — OFFICE VISIT (OUTPATIENT)
Dept: ORTHOPEDIC SURGERY | Age: 55
End: 2025-09-04

## 2025-09-04 VITALS — WEIGHT: 293 LBS | BODY MASS INDEX: 50.02 KG/M2 | HEIGHT: 64 IN

## 2025-09-04 DIAGNOSIS — M17.0 PRIMARY OSTEOARTHRITIS OF BOTH KNEES: Primary | ICD-10-CM

## (undated) DEVICE — 4.5 MM INCISOR PLUS STRAIGHT                                    BLADES, POWER/EP-1, VIOLET, PACKAGED                                    6 PER BOX, STERILE

## (undated) DEVICE — STRIP,CLOSURE,WOUND,MEDI-STRIP,1/2X4: Brand: MEDLINE

## (undated) DEVICE — GOWN,AURORA,NONREINF,RAGLAN,XXL,STERILE: Brand: MEDLINE

## (undated) DEVICE — SUTURE ETHLN SZ 4-0 L18IN NONABSORBABLE BLK L19MM PS-2 3/8 1667H

## (undated) DEVICE — PADDING CAST W6INXL4YD COT LO LINTING WYTEX

## (undated) DEVICE — 3M™ STERI-STRIP™ REINFORCED ADHESIVE SKIN CLOSURES, R1546, 1/4 IN X 4 IN (6 MM X 100 MM), 10 STRIPS/ENVELOPE: Brand: 3M™ STERI-STRIP™

## (undated) DEVICE — GOWN,SIRUS,NONRNF,3XL,18/CS: Brand: MEDLINE

## (undated) DEVICE — SUTURE NONABSORBABLE MONOFILAMENT 4-0 FS-2 18 IN ETHILON 662H

## (undated) DEVICE — WEREWOLF FLOW 90 COBLATION WAND: Brand: COBLATION

## (undated) DEVICE — STERILE POLYISOPRENE POWDER-FREE SURGICAL GLOVES WITH EMOLLIENT COATING: Brand: PROTEXIS

## (undated) DEVICE — TUBING, SUCTION, 3/16" X 10', STRAIGHT: Brand: MEDLINE

## (undated) DEVICE — 3M™ STERI-STRIP™ COMPOUND BENZOIN TINCTURE 40 BAGS/CARTON 4 CARTONS/CASE C1544: Brand: 3M™ STERI-STRIP™

## (undated) DEVICE — Device: Brand: ACCUPORT® CANNULA

## (undated) DEVICE — MANIFOLD SURG NEPTUNE WST MGMT

## (undated) DEVICE — APPLICATOR PREP 26ML 0.7% IOD POVACRYLEX 74% ISO ALC ST

## (undated) DEVICE — Device

## (undated) DEVICE — GLOVE SURG SZ 65 CRM LTX FREE POLYISOPRENE POLYMER BEAD ANTI

## (undated) DEVICE — GOWN SIRUS NONREIN XL W/TWL: Brand: MEDLINE INDUSTRIES, INC.

## (undated) DEVICE — GLOVE ORANGE PI 8 1/2   MSG9085

## (undated) DEVICE — TUBE IRRIG L8IN LNG PT W/ CONN FOR PMP SYS REDEUCE

## (undated) DEVICE — YANKAUER,BULB TIP,W/O VENT,RIGID,STERILE: Brand: MEDLINE

## (undated) DEVICE — Z INACTIVE NO SUPPLIER SOLUTIONIRRIG 3000ML 0.9% SOD CHL FLX CONT [79720808] [HOSPIRA WORLDWIDE INC]

## (undated) DEVICE — Z INACTIVE USE 2660664 SOLUTION IRRIG 3000ML 0.9% SOD CHL USP UROMATIC PLAS CONT

## (undated) DEVICE — C-ARM: Brand: UNBRANDED

## (undated) DEVICE — PACK PROCEDURE SURG SURGERYARTHROSCOPY KNEE

## (undated) DEVICE — TUBING PMP L8FT LNG W/ CONN FOR AR-6400 REDEUCE

## (undated) DEVICE — COVER,MAYO STAND,STERILE: Brand: MEDLINE

## (undated) DEVICE — GLOVE SURG SZ 85 L12IN FNGR THK94MIL STD WHT ISOLEX LTX

## (undated) DEVICE — BLADE SHV L13CM DIA4MM EXCALIBUR AGG COOLCUT